# Patient Record
Sex: FEMALE | Race: WHITE | NOT HISPANIC OR LATINO | Employment: FULL TIME | URBAN - METROPOLITAN AREA
[De-identification: names, ages, dates, MRNs, and addresses within clinical notes are randomized per-mention and may not be internally consistent; named-entity substitution may affect disease eponyms.]

---

## 2017-06-06 ENCOUNTER — TELEPHONE (OUTPATIENT)
Dept: MEDICAL GROUP | Facility: MEDICAL CENTER | Age: 42
End: 2017-06-06

## 2017-06-06 DIAGNOSIS — N63.0 BREAST NODULE: ICD-10-CM

## 2017-06-06 DIAGNOSIS — Z12.31 ENCOUNTER FOR SCREENING MAMMOGRAM FOR MALIGNANT NEOPLASM OF BREAST: ICD-10-CM

## 2017-06-07 NOTE — TELEPHONE ENCOUNTER
Please let pt aldoo that its time to recheck her mammo, pancho this time for 6 month f/u.  I have ordered test

## 2017-08-11 ENCOUNTER — HOSPITAL ENCOUNTER (OUTPATIENT)
Dept: RADIOLOGY | Facility: MEDICAL CENTER | Age: 42
End: 2017-08-11
Attending: NURSE PRACTITIONER
Payer: COMMERCIAL

## 2017-08-11 DIAGNOSIS — N63.0 BREAST NODULE: ICD-10-CM

## 2017-08-11 DIAGNOSIS — Z12.31 ENCOUNTER FOR SCREENING MAMMOGRAM FOR MALIGNANT NEOPLASM OF BREAST: ICD-10-CM

## 2017-08-11 PROCEDURE — G0204 DX MAMMO INCL CAD BI: HCPCS

## 2017-08-15 ENCOUNTER — OFFICE VISIT (OUTPATIENT)
Dept: MEDICAL GROUP | Facility: MEDICAL CENTER | Age: 42
End: 2017-08-15
Payer: COMMERCIAL

## 2017-08-15 VITALS
DIASTOLIC BLOOD PRESSURE: 80 MMHG | SYSTOLIC BLOOD PRESSURE: 110 MMHG | HEIGHT: 67 IN | WEIGHT: 202 LBS | BODY MASS INDEX: 31.71 KG/M2 | TEMPERATURE: 98.1 F | HEART RATE: 67 BPM | OXYGEN SATURATION: 96 %

## 2017-08-15 DIAGNOSIS — E66.9 OBESITY (BMI 30-39.9): ICD-10-CM

## 2017-08-15 DIAGNOSIS — Z80.0 FAMILY HISTORY OF COLON CANCER: ICD-10-CM

## 2017-08-15 DIAGNOSIS — E55.9 VITAMIN D DEFICIENCY: ICD-10-CM

## 2017-08-15 DIAGNOSIS — N63.0 BREAST NODULE: ICD-10-CM

## 2017-08-15 DIAGNOSIS — Z13.220 SCREENING FOR HYPERLIPIDEMIA: ICD-10-CM

## 2017-08-15 DIAGNOSIS — R73.01 IFG (IMPAIRED FASTING GLUCOSE): ICD-10-CM

## 2017-08-15 DIAGNOSIS — Z23 NEED FOR TDAP VACCINATION: ICD-10-CM

## 2017-08-15 DIAGNOSIS — K92.1 BLOODY STOOL: ICD-10-CM

## 2017-08-15 DIAGNOSIS — Z83.3 FAMILY HISTORY OF DIABETES MELLITUS IN FATHER: ICD-10-CM

## 2017-08-15 PROCEDURE — 99214 OFFICE O/P EST MOD 30 MIN: CPT | Mod: 25 | Performed by: NURSE PRACTITIONER

## 2017-08-15 PROCEDURE — 90715 TDAP VACCINE 7 YRS/> IM: CPT | Performed by: NURSE PRACTITIONER

## 2017-08-15 PROCEDURE — 90471 IMMUNIZATION ADMIN: CPT | Performed by: NURSE PRACTITIONER

## 2017-08-15 RX ORDER — HYDROCORTISONE ACETATE 25 MG/1
25 SUPPOSITORY RECTAL EVERY 12 HOURS
Qty: 28 SUPPOSITORY | Refills: 0 | Status: ON HOLD | OUTPATIENT
Start: 2017-08-15 | End: 2018-09-12

## 2017-08-15 RX ORDER — MULTIVIT-MIN/IRON/FOLIC ACID/K 18-600-40
2000 CAPSULE ORAL DAILY
Qty: 30 CAP | Refills: 0
Start: 2017-08-15 | End: 2021-07-02

## 2017-08-15 ASSESSMENT — PATIENT HEALTH QUESTIONNAIRE - PHQ9: CLINICAL INTERPRETATION OF PHQ2 SCORE: 0

## 2017-08-15 NOTE — PROGRESS NOTES
"Subjective:      Shawna Walker is a 41 y.o. female who presents with Results            HPI  Seen in f/u for FH of colon cancer.  She has a FH of colon cancer.  Her pat GM had colon cancer.  Pt is also having blood in her stool.  Some days she will have more bright red blood in toilet.  Started several months ago.  No pain with bm''s. No known hemorrhoids.  No abd or back pain.  No black tarry stool.  She doesn't have hemorrhoids.     Reviewed mammo with pt. Her left breast nodule is stable.  Will continue monitoring.   She is due tdap  Reviewed last years lab with pt. H er LP is wnl.  TSH is wnl  CMP showed elevated glucose at 106.  She just a low carb diet.  Her father  with complications of DM.    LP was wnl.  vitmain d was low at 20. She took the ergocalciferol.  Now on otc supplement.      Patient Active Problem List    Diagnosis Date Noted   • Obesity (BMI 30-39.9) 08/15/2017   • Allergy to pollen    • PCOS (polycystic ovarian syndrome)      No current outpatient prescriptions on file.     No current facility-administered medications for this visit.     Allergies   Allergen Reactions   • Sudafed Unspecified     \"reverse effect\" makes mucous thicker   RXN=15 years ago stopped taking         ROS  Review of Systems   Constitutional: Negative.  Negative for fever, chills, weight loss, malaise/fatigue and diaphoresis.   HENT: Negative.  Negative for hearing loss, ear pain, nosebleeds, congestion, sore throat, neck pain, tinnitus and ear discharge.    Respiratory: Negative.  Negative for cough, hemoptysis, sputum production, shortness of breath, wheezing and stridor.    Cardiovascular: Negative.  Negative for chest pain, palpitations, orthopnea, claudication, leg swelling and PND.   Gastrointestinal: denies nausea, vomiting, diarrhea, constipation, heartburn.  Genitourinary: Denies dysuria, hematuria, urinary incontinence, frequency or urgency.         Objective:     /80 mmHg  Pulse 67  Temp(Src) 36.7 °C " "(98.1 °F)  Ht 1.689 m (5' 6.5\")  Wt 91.627 kg (202 lb)  BMI 32.12 kg/m2  SpO2 96%  Breastfeeding? No     Physical Exam      Physical Exam   Vitals reviewed.  Constitutional: oriented to person, place, and time. appears well-developed and well-nourished. No distress.   Cardiovascular: Normal rate, regular rhythm, normal heart sounds and intact distal pulses.  Exam reveals no gallop and no friction rub.  No murmur heard.  No carotid bruits.   Pulmonary/Chest: Effort normal and breath sounds normal. No stridor. No respiratory distress. no wheezes or rales. exhibits no tenderness.   Musculoskeletal: Normal range of motion. exhibits no edema. pancho pedal pulses 2+.  Abd:  No CVAT,  Soft,  Bs noted in all quadrants.  No HSM.  No abdominal tenderness.  Neurological: alert and oriented to person, place, and time. exhibits normal muscle tone. Coordination normal.   Skin: Skin is warm and dry. no diaphoresis.   Psychiatric: normal mood and affect. behavior is normal.            Assessment/Plan:     1. IFG (impaired fasting glucose)  COMP METABOLIC PANEL    HEMOGLOBIN A1C    MICROALBUMIN CREAT RATIO URINE    check CMP, A1C.  f/u with pt with all test results and yrly or sooner if lab abn   2. Family history of diabetes mellitus in father     3. Obesity (BMI 30-39.9)  Patient identified as having weight management issue.  Appropriate orders and counseling given.   4. Family history of colon cancer  REFERRAL TO GASTROENTEROLOGY    refer to GI for colonoscopy.  try anusol if ins will cover for poss internal hemorrhoids   5. Bloody stool  REFERRAL TO GASTROENTEROLOGY    hydrocortisone (ANUSOL-HC) 25 MG Suppos   6. Need for Tdap vaccination  Tdap =>8yo IM   7. Breast nodule     8. Vitamin D deficiency  Cholecalciferol (VITAMIN D) 2000 UNITS Cap    VITAMIN D,25 HYDROXY   9. Screening for hyperlipidemia  LIPID PROFILE       "

## 2017-08-15 NOTE — MR AVS SNAPSHOT
"        Shawna Walker   8/15/2017 8:45 AM   Office Visit   MRN: [id number]    Department:  [facility]   Dept Phone:  [phone]    Description:  Female : 1975   Provider:  CHERIE De La Vega           Reason for Visit     Results           Allergies as of 8/15/2017     Allergen Noted Reactions    Sudafed 12/15/2014   Unspecified    \"reverse effect\" makes mucous thicker   RXN=15 years ago stopped taking      You were diagnosed with     IFG (impaired fasting glucose)   [265196]   check CMP, A1C.  f/u with pt with all test results and yrly or sooner if lab abn    Family history of diabetes mellitus in father   [4375937]       Obesity (BMI 30-39.9)   [806905]       Family history of colon cancer   [368655]   refer to GI for colonoscopy.  try anusol if ins will cover for poss internal hemorrhoids    Bloody stool   [195808]       Need for Tdap vaccination   [214839]       Breast nodule   [937670]       Vitamin D deficiency   [2799619]       Screening for hyperlipidemia   [980266]         Vital Signs     Blood Pressure Pulse Temperature Height Weight Body Mass Index    110/80 mmHg 67 36.7 °C (98.1 °F) 1.689 m (5' 6.5\") 91.627 kg (202 lb) 32.12 kg/m2    Oxygen Saturation Breastfeeding? Smoking Status             96% No Never Smoker          Basic Information     Date Of Birth Sex Race Ethnicity Preferred Language    1975 Female White Non- English      Problem List              ICD-10-CM Priority Class Noted - Resolved    Allergy to pollen J30.1   Unknown - Present    PCOS (polycystic ovarian syndrome) E28.2   Unknown - Present    Obesity (BMI 30-39.9) E66.9   8/15/2017 - Present      Health Maintenance        Date Due Completion Dates    IMM DTaP/Tdap/Td Vaccine (1 - Tdap) 1994 ---    IMM INFLUENZA (1) 2017 ---    MAMMOGRAM 2018, 2016, 2016, 2016    PAP SMEAR 2019 (Done)    Override on 2016: Done            Current Immunizations    " Tdap Vaccine 8/15/2017      Below and/or attached are the medications your provider expects you to take. Review all of your home medications and newly ordered medications with your provider and/or pharmacist. Follow medication instructions as directed by your provider and/or pharmacist. Please keep your medication list with you and share with your provider. Update the information when medications are discontinued, doses are changed, or new medications (including over-the-counter products) are added; and carry medication information at all times in the event of emergency situations     Allergies:  SUDAFED - Unspecified               Medications  Valid as of: August 15, 2017 -  9:23 AM    Generic Name Brand Name Tablet Size Instructions for use    Cholecalciferol (Cap) Vitamin D 2000 UNITS Take 1 Cap by mouth every day.        Hydrocortisone Acetate (Suppos) ANUSOL-HC 25 MG Insert 1 Suppository in rectum every 12 hours.        .                 Medicines prescribed today were sent to:     [facility] [address] [place] [address], [place] - [address]    [address], [place] [address]    Phone: [phone] Fax: [phone]    Open 24 Hours?: No      Medication refill instructions:       If your prescription bottle indicates you have medication refills left, it is not necessary to call your provider’s office. Please contact your pharmacy and they will refill your medication.    If your prescription bottle indicates you do not have any refills left, you may request refills at any time through one of the following ways: The online [employer] system (except Urgent Care), by calling your provider’s office, or by asking your pharmacy to contact your provider’s office with a refill request. Medication refills are processed only during regular business hours and may not be available until the next business day. Your provider may request additional information or to have a follow-up visit with you prior to refilling your  medication.   *Please Note: Medication refills are assigned a new Rx number when refilled electronically. Your pharmacy may indicate that no refills were authorized even though a new prescription for the same medication is available at the pharmacy. Please request the medicine by name with the pharmacy before contacting your provider for a refill.        Your To Do List     Future Labs/Procedures Complete By Expires    COMP METABOLIC PANEL  As directed 8/16/2018    HEMOGLOBIN A1C  As directed 8/16/2018    LIPID PROFILE  As directed 8/16/2018    MICROALBUMIN CREAT RATIO URINE  As directed 8/16/2018    VITAMIN D,25 HYDROXY  As directed 8/16/2018      Referral     A referral request has been sent to our patient care coordination department. Please allow 3-5 business days for us to process this request and contact you either by phone or mail. If you do not hear from us by the 5th business day, please call us at [phone].           Daily[employer] Access Code: Activation code not generated  Current Daily[employer] Status: Active

## 2018-02-04 ENCOUNTER — TELEPHONE (OUTPATIENT)
Dept: MEDICAL GROUP | Facility: MEDICAL CENTER | Age: 43
End: 2018-02-04

## 2018-02-04 DIAGNOSIS — N63.0 BREAST NODULE: ICD-10-CM

## 2018-04-15 ENCOUNTER — TELEPHONE (OUTPATIENT)
Dept: MEDICAL GROUP | Facility: MEDICAL CENTER | Age: 43
End: 2018-04-15

## 2018-04-26 ENCOUNTER — HOSPITAL ENCOUNTER (EMERGENCY)
Facility: MEDICAL CENTER | Age: 43
End: 2018-04-26

## 2018-04-26 VITALS
TEMPERATURE: 97.2 F | WEIGHT: 201.06 LBS | RESPIRATION RATE: 18 BRPM | DIASTOLIC BLOOD PRESSURE: 100 MMHG | BODY MASS INDEX: 32.31 KG/M2 | HEART RATE: 97 BPM | HEIGHT: 66 IN | SYSTOLIC BLOOD PRESSURE: 133 MMHG

## 2018-04-26 DIAGNOSIS — F10.920 ALCOHOLIC INTOXICATION WITHOUT COMPLICATION (HCC): ICD-10-CM

## 2018-04-26 PROCEDURE — 99283 EMERGENCY DEPT VISIT LOW MDM: CPT

## 2018-04-26 PROCEDURE — 99284 EMERGENCY DEPT VISIT MOD MDM: CPT

## 2018-04-26 ASSESSMENT — PAIN SCALES - GENERAL: PAINLEVEL_OUTOF10: 0

## 2018-04-26 NOTE — ED PROVIDER NOTES
ED Provider Note    I have called and left a message with patient stating that she may return to the emergency department at any time, that she should return tonight and that if her symptoms fail to resolve despite clinical sobriety that she should return.

## 2018-04-26 NOTE — ED NOTES
"Patient brought in by mother for blurred vision and intoxication. Patient states \"my eyes are blurry but it might just be my contacts.\" Mother states patient drank 3 bottle of wine tonight.   "

## 2018-04-26 NOTE — ED PROVIDER NOTES
"ED Provider Note    CHIEF COMPLAINT  Chief Complaint   Patient presents with   • Alcohol Intoxication       HPI  Shawna Walker is a 42 y.o. female who presents with chief complaint of alcohol intoxication. Per patient she drank 3 bottles of wine tonight. Wine was over-the-counter, she denies any unconventional alcohol use. Patient reports mild blurriness of her vision. She denies any associated weakness or numbness. She denies any fevers or constitutional symptoms. She denies any headache.    REVIEW OF SYSTEMS  Review of Systems   All other systems reviewed and are negative.    See HPI for further details. All other systems are negative.     PAST MEDICAL HISTORY   has a past medical history of PCOS (polycystic ovarian syndrome).    SOCIAL HISTORY  Social History     Social History Main Topics   • Smoking status: Never Smoker   • Smokeless tobacco: Never Used   • Alcohol use 8.4 oz/week     14 Glasses of wine per week      Comment: 4 drinks per week    • Drug use: No   • Sexual activity: Not on file       SURGICAL HISTORY   has a past surgical history that includes tubal ligation; tubal reanastomosis (06/2015); hysterectomy robotic (8/15/2016); and cystoscopy (8/15/2016).    CURRENT MEDICATIONS  Home Medications    **Home medications have not yet been reviewed for this encounter**         ALLERGIES  Allergies   Allergen Reactions   • Sudafed Unspecified     \"reverse effect\" makes mucous thicker   RXN=15 years ago stopped taking       PHYSICAL EXAM  Physical Exam   Constitutional: She is oriented to person, place, and time. She appears well-developed and well-nourished.   Eyes: Conjunctivae are normal. Pupils are equal, round, and reactive to light. Right eye exhibits no discharge. Left eye exhibits no discharge.   Neck: Normal range of motion.   Cardiovascular: Normal rate and regular rhythm.    Pulmonary/Chest: Effort normal and breath sounds normal. No respiratory distress. She has no wheezes.   Abdominal: Soft. " "Bowel sounds are normal. She exhibits no distension. There is no tenderness. There is no rebound and no guarding.   Neurological: She is alert and oriented to person, place, and time.   Distal and proximal strength 5/5 in upper and lower extremities. Normal gait. No dysmetria. No sensation deficits. No visual field deficits. Cranial nerves intact.      Skin: Skin is warm.   Psychiatric:   Emotionally labile. Repetitive drawn out blinking         COURSE & MEDICAL DECISION MAKING  Pertinent Labs & Imaging studies reviewed. (See chart for details)  I evaluated patient. She has symptoms consistent with mild alcohol intoxication. I am concerned about her blurry vision and I discussed with the patient and told her this was very atypical of alcohol intoxication, I stated that she is acting a little \"wonky\" and that I was concerned about a possible coingestant or metabolic cause. Patient became very angry with this statement. I apologized for my poor choice of words and stated that I was merely trying to convey that her presentation is atypical. I relayed this to mother who is at bedside. I stated that given the exam inconsistencies that possibly checking a CT scan and basic labs would be pertinent versus observing the patient to ensure that symptoms all resolved upon patient's sobering up. Patient is verbally combative and I left the room for her mother and her to discuss these two treatment options. Her mother is not displaying any signs of alcohol intoxication or intoxication otherwise. Shortly after walking out of the room patient eloped with her mother who is clinically sober.       FINAL IMPRESSION  1. Alcohol intoxication         Electronically signed by: Gagan Randall, 4/26/2018 1:19 AM      "

## 2018-05-16 ENCOUNTER — TELEPHONE (OUTPATIENT)
Dept: MEDICAL GROUP | Facility: MEDICAL CENTER | Age: 43
End: 2018-05-16

## 2018-05-16 NOTE — LETTER
May 23, 2018        Shawna Walker  [address]  [place] [address]        Dear Shawna:    Priscilla Orozco's office has tried contacting you. At your earliest convenience please contact our office at [phone].      If you have any questions or concerns, please don't hesitate to call.        Sincerely,        Priscilla Orozco, QUANGPAMOR.    Electronically Signed

## 2018-05-17 NOTE — TELEPHONE ENCOUNTER
Please check with pt and see when or if she is going to do the f/u mammo and us that was ordered in feb.

## 2018-07-10 ENCOUNTER — TELEPHONE (OUTPATIENT)
Dept: MEDICAL GROUP | Facility: MEDICAL CENTER | Age: 43
End: 2018-07-10

## 2018-07-11 NOTE — TELEPHONE ENCOUNTER
Pt informed- pt states we stopped taking her insurance.pt states her insurance starts up in Sept and she will do it then.

## 2018-09-11 ENCOUNTER — APPOINTMENT (OUTPATIENT)
Dept: RADIOLOGY | Facility: MEDICAL CENTER | Age: 43
DRG: 917 | End: 2018-09-11
Attending: EMERGENCY MEDICINE
Payer: COMMERCIAL

## 2018-09-11 ENCOUNTER — HOSPITAL ENCOUNTER (INPATIENT)
Facility: MEDICAL CENTER | Age: 43
LOS: 2 days | DRG: 917 | End: 2018-09-13
Attending: EMERGENCY MEDICINE | Admitting: HOSPITALIST
Payer: COMMERCIAL

## 2018-09-11 DIAGNOSIS — G92.9 TOXIC ENCEPHALOPATHY: ICD-10-CM

## 2018-09-11 DIAGNOSIS — T42.4X2A INTENTIONAL BENZODIAZEPINE OVERDOSE, INITIAL ENCOUNTER (HCC): ICD-10-CM

## 2018-09-11 PROBLEM — T50.901A OVERDOSE: Status: ACTIVE | Noted: 2018-09-11

## 2018-09-11 PROBLEM — R09.02 HYPOXEMIA: Status: ACTIVE | Noted: 2018-09-11

## 2018-09-11 PROBLEM — F10.921 ACUTE ALCOHOLIC INTOXICATION WITH DELIRIUM (HCC): Status: ACTIVE | Noted: 2018-09-11

## 2018-09-11 LAB
ALBUMIN SERPL BCP-MCNC: 4 G/DL (ref 3.2–4.9)
ALBUMIN/GLOB SERPL: 1.4 G/DL
ALP SERPL-CCNC: 48 U/L (ref 30–99)
ALT SERPL-CCNC: 20 U/L (ref 2–50)
AMPHETAMINES UR QL: NEGATIVE
ANION GAP SERPL CALC-SCNC: 11 MMOL/L (ref 0–11.9)
ANION GAP SERPL CALC-SCNC: 8 MMOL/L (ref 0–11.9)
APAP SERPL-MCNC: <10 UG/ML (ref 10–30)
AST SERPL-CCNC: 20 U/L (ref 12–45)
BARBITURATES UR QL SCN: NEGATIVE
BASE EXCESS BLDV CALC-SCNC: -6 MMOL/L
BASOPHILS # BLD AUTO: 0.5 % (ref 0–1.8)
BASOPHILS # BLD: 0.04 K/UL (ref 0–0.12)
BENZODIAZ UR QL SCN: NEGATIVE
BILIRUB SERPL-MCNC: 0.5 MG/DL (ref 0.1–1.5)
BODY TEMPERATURE: ABNORMAL CENTIGRADE
BUN SERPL-MCNC: 10 MG/DL (ref 8–22)
BUN SERPL-MCNC: 13 MG/DL (ref 8–22)
BZE UR QL SCN: NEGATIVE
CALCIUM SERPL-MCNC: 8.3 MG/DL (ref 8.4–10.2)
CALCIUM SERPL-MCNC: 8.4 MG/DL (ref 8.4–10.2)
CHLORIDE SERPL-SCNC: 110 MMOL/L (ref 96–112)
CHLORIDE SERPL-SCNC: 111 MMOL/L (ref 96–112)
CO2 SERPL-SCNC: 19 MMOL/L (ref 20–33)
CO2 SERPL-SCNC: 22 MMOL/L (ref 20–33)
CREAT SERPL-MCNC: 0.73 MG/DL (ref 0.5–1.4)
CREAT SERPL-MCNC: 0.78 MG/DL (ref 0.5–1.4)
EOSINOPHIL # BLD AUTO: 0.23 K/UL (ref 0–0.51)
EOSINOPHIL NFR BLD: 2.9 % (ref 0–6.9)
ERYTHROCYTE [DISTWIDTH] IN BLOOD BY AUTOMATED COUNT: 43.3 FL (ref 35.9–50)
ETHANOL BLD-MCNC: 0.28 G/DL
GLOBULIN SER CALC-MCNC: 2.8 G/DL (ref 1.9–3.5)
GLUCOSE SERPL-MCNC: 104 MG/DL (ref 65–99)
GLUCOSE SERPL-MCNC: 106 MG/DL (ref 65–99)
HCG SERPL QL: NEGATIVE
HCO3 BLDV-SCNC: 18 MMOL/L (ref 24–28)
HCT VFR BLD AUTO: 41.3 % (ref 37–47)
HGB BLD-MCNC: 14.1 G/DL (ref 12–16)
IMM GRANULOCYTES # BLD AUTO: 0.01 K/UL (ref 0–0.11)
IMM GRANULOCYTES NFR BLD AUTO: 0.1 % (ref 0–0.9)
LYMPHOCYTES # BLD AUTO: 3.52 K/UL (ref 1–4.8)
LYMPHOCYTES NFR BLD: 43.7 % (ref 22–41)
MAGNESIUM SERPL-MCNC: 2.2 MG/DL (ref 1.5–2.5)
MCH RBC QN AUTO: 32 PG (ref 27–33)
MCHC RBC AUTO-ENTMCNC: 34.1 G/DL (ref 33.6–35)
MCV RBC AUTO: 93.9 FL (ref 81.4–97.8)
MONOCYTES # BLD AUTO: 0.63 K/UL (ref 0–0.85)
MONOCYTES NFR BLD AUTO: 7.8 % (ref 0–13.4)
NEUTROPHILS # BLD AUTO: 3.62 K/UL (ref 2–7.15)
NEUTROPHILS NFR BLD: 45 % (ref 44–72)
NRBC # BLD AUTO: 0 K/UL
NRBC BLD-RTO: 0 /100 WBC
PCO2 BLDV: 30.9 MMHG (ref 41–51)
PCP UR QL SCN: NEGATIVE
PH BLDV: 7.39 [PH] (ref 7.31–7.45)
PLATELET # BLD AUTO: 237 K/UL (ref 164–446)
PMV BLD AUTO: 9.2 FL (ref 9–12.9)
PO2 BLDV: 101.9 MMHG (ref 25–40)
POTASSIUM SERPL-SCNC: 3.6 MMOL/L (ref 3.6–5.5)
POTASSIUM SERPL-SCNC: 3.7 MMOL/L (ref 3.6–5.5)
PROT SERPL-MCNC: 6.8 G/DL (ref 6–8.2)
RBC # BLD AUTO: 4.4 M/UL (ref 4.2–5.4)
SALICYLATES SERPL-MCNC: <4 MG/DL (ref 15–25)
SAO2 % BLDV: 97.4 %
SODIUM SERPL-SCNC: 140 MMOL/L (ref 135–145)
SODIUM SERPL-SCNC: 141 MMOL/L (ref 135–145)
TSH SERPL DL<=0.005 MIU/L-ACNC: 1.07 UIU/ML (ref 0.38–5.33)
UR OPIATES 2659: NEGATIVE
UR THC 2511T: NEGATIVE
WBC # BLD AUTO: 8.1 K/UL (ref 4.8–10.8)

## 2018-09-11 PROCEDURE — 700105 HCHG RX REV CODE 258: Performed by: EMERGENCY MEDICINE

## 2018-09-11 PROCEDURE — 99222 1ST HOSP IP/OBS MODERATE 55: CPT | Performed by: HOSPITALIST

## 2018-09-11 PROCEDURE — 71045 X-RAY EXAM CHEST 1 VIEW: CPT

## 2018-09-11 PROCEDURE — 700105 HCHG RX REV CODE 258: Performed by: HOSPITALIST

## 2018-09-11 PROCEDURE — 85025 COMPLETE CBC W/AUTO DIFF WBC: CPT

## 2018-09-11 PROCEDURE — 96365 THER/PROPH/DIAG IV INF INIT: CPT

## 2018-09-11 PROCEDURE — 93005 ELECTROCARDIOGRAM TRACING: CPT | Performed by: EMERGENCY MEDICINE

## 2018-09-11 PROCEDURE — 80053 COMPREHEN METABOLIC PANEL: CPT

## 2018-09-11 PROCEDURE — 80048 BASIC METABOLIC PNL TOTAL CA: CPT

## 2018-09-11 PROCEDURE — 770022 HCHG ROOM/CARE - ICU (200)

## 2018-09-11 PROCEDURE — 700101 HCHG RX REV CODE 250

## 2018-09-11 PROCEDURE — 80305 DRUG TEST PRSMV DIR OPT OBS: CPT

## 2018-09-11 PROCEDURE — 99291 CRITICAL CARE FIRST HOUR: CPT

## 2018-09-11 PROCEDURE — 84703 CHORIONIC GONADOTROPIN ASSAY: CPT

## 2018-09-11 PROCEDURE — 700105 HCHG RX REV CODE 258: Performed by: INTERNAL MEDICINE

## 2018-09-11 PROCEDURE — 82803 BLOOD GASES ANY COMBINATION: CPT

## 2018-09-11 PROCEDURE — 80307 DRUG TEST PRSMV CHEM ANLYZR: CPT

## 2018-09-11 PROCEDURE — 83735 ASSAY OF MAGNESIUM: CPT

## 2018-09-11 PROCEDURE — 84443 ASSAY THYROID STIM HORMONE: CPT

## 2018-09-11 RX ORDER — THIAMINE MONONITRATE (VIT B1) 100 MG
100 TABLET ORAL DAILY
Status: DISCONTINUED | OUTPATIENT
Start: 2018-09-12 | End: 2018-09-13 | Stop reason: HOSPADM

## 2018-09-11 RX ORDER — SODIUM CHLORIDE 9 MG/ML
1000 INJECTION, SOLUTION INTRAVENOUS ONCE
Status: COMPLETED | OUTPATIENT
Start: 2018-09-11 | End: 2018-09-11

## 2018-09-11 RX ORDER — SODIUM CHLORIDE 9 MG/ML
500 INJECTION, SOLUTION INTRAVENOUS ONCE
Status: COMPLETED | OUTPATIENT
Start: 2018-09-12 | End: 2018-09-12

## 2018-09-11 RX ORDER — PROMETHAZINE HYDROCHLORIDE 25 MG/1
12.5-25 SUPPOSITORY RECTAL EVERY 4 HOURS PRN
Status: DISCONTINUED | OUTPATIENT
Start: 2018-09-11 | End: 2018-09-13 | Stop reason: HOSPADM

## 2018-09-11 RX ORDER — FOLIC ACID 1 MG/1
1 TABLET ORAL DAILY
Status: DISCONTINUED | OUTPATIENT
Start: 2018-09-12 | End: 2018-09-13 | Stop reason: HOSPADM

## 2018-09-11 RX ORDER — SODIUM CHLORIDE 9 MG/ML
INJECTION, SOLUTION INTRAVENOUS CONTINUOUS
Status: DISCONTINUED | OUTPATIENT
Start: 2018-09-11 | End: 2018-09-13 | Stop reason: HOSPADM

## 2018-09-11 RX ORDER — ONDANSETRON 2 MG/ML
4 INJECTION INTRAMUSCULAR; INTRAVENOUS EVERY 4 HOURS PRN
Status: DISCONTINUED | OUTPATIENT
Start: 2018-09-11 | End: 2018-09-13 | Stop reason: HOSPADM

## 2018-09-11 RX ORDER — PROMETHAZINE HYDROCHLORIDE 25 MG/1
12.5-25 TABLET ORAL EVERY 4 HOURS PRN
Status: DISCONTINUED | OUTPATIENT
Start: 2018-09-11 | End: 2018-09-13 | Stop reason: HOSPADM

## 2018-09-11 RX ORDER — M-VIT,TX,IRON,MINS/CALC/FOLIC 27MG-0.4MG
1 TABLET ORAL DAILY
COMMUNITY
End: 2019-06-19

## 2018-09-11 RX ORDER — ONDANSETRON 4 MG/1
4 TABLET, ORALLY DISINTEGRATING ORAL EVERY 4 HOURS PRN
Status: DISCONTINUED | OUTPATIENT
Start: 2018-09-11 | End: 2018-09-13 | Stop reason: HOSPADM

## 2018-09-11 RX ORDER — FLUOXETINE HYDROCHLORIDE 40 MG/1
80 CAPSULE ORAL DAILY
COMMUNITY
End: 2021-07-02

## 2018-09-11 RX ADMIN — THIAMINE HYDROCHLORIDE 1000 ML: 100 INJECTION, SOLUTION INTRAMUSCULAR; INTRAVENOUS at 21:05

## 2018-09-11 RX ADMIN — SODIUM CHLORIDE 500 ML: 9 INJECTION, SOLUTION INTRAVENOUS at 23:53

## 2018-09-11 RX ADMIN — SODIUM CHLORIDE 1000 ML: 9 INJECTION, SOLUTION INTRAVENOUS at 18:00

## 2018-09-11 RX ADMIN — SODIUM CHLORIDE: 9 INJECTION, SOLUTION INTRAVENOUS at 22:33

## 2018-09-11 ASSESSMENT — COPD QUESTIONNAIRES
COPD SCREENING SCORE: 0
HAVE YOU SMOKED AT LEAST 100 CIGARETTES IN YOUR ENTIRE LIFE: NO/DON'T KNOW
DO YOU EVER COUGH UP ANY MUCUS OR PHLEGM?: NO/ONLY WITH OCCASIONAL COLDS OR INFECTIONS
IN THE PAST 12 MONTHS DO YOU DO LESS THAN YOU USED TO BECAUSE OF YOUR BREATHING PROBLEMS: DISAGREE/UNSURE
DURING THE PAST 4 WEEKS HOW MUCH DID YOU FEEL SHORT OF BREATH: NONE/LITTLE OF THE TIME

## 2018-09-11 ASSESSMENT — LIFESTYLE VARIABLES
VISUAL DISTURBANCES: NOT PRESENT
NAUSEA AND VOMITING: NO NAUSEA AND NO VOMITING
AUDITORY DISTURBANCES: NOT PRESENT
ANXIETY: *
TREMOR: TREMOR NOT VISIBLE BUT CAN BE FELT, FINGERTIP TO FINGERTIP
AUDITORY DISTURBANCES: NOT PRESENT
EVER HAD A DRINK FIRST THING IN THE MORNING TO STEADY YOUR NERVES TO GET RID OF A HANGOVER: NO
PAROXYSMAL SWEATS: NO SWEAT VISIBLE
HEADACHE, FULLNESS IN HEAD: NOT PRESENT
HAVE PEOPLE ANNOYED YOU BY CRITICIZING YOUR DRINKING: YES
TREMOR: TREMOR NOT VISIBLE BUT CAN BE FELT, FINGERTIP TO FINGERTIP
ON A TYPICAL DAY WHEN YOU DRINK ALCOHOL HOW MANY DRINKS DO YOU HAVE: 5
EVER FELT BAD OR GUILTY ABOUT YOUR DRINKING: YES
AGITATION: NORMAL ACTIVITY
HEADACHE, FULLNESS IN HEAD: NOT PRESENT
TOTAL SCORE: 6
ANXIETY: *
HAVE YOU EVER FELT YOU SHOULD CUT DOWN ON YOUR DRINKING: YES
EVER_SMOKED: YES
DOES PATIENT WANT TO STOP DRINKING: NO
HOW MANY TIMES IN THE PAST YEAR HAVE YOU HAD 5 OR MORE DRINKS IN A DAY: 40
EVER_SMOKED: YES
TOTAL SCORE: 3
TOTAL SCORE: 3
AVERAGE NUMBER OF DAYS PER WEEK YOU HAVE A DRINK CONTAINING ALCOHOL: 7
ALCOHOL_USE: YES
VISUAL DISTURBANCES: NOT PRESENT
PAROXYSMAL SWEATS: NO SWEAT VISIBLE
TOTAL SCORE: 3
ORIENTATION AND CLOUDING OF SENSORIUM: CANNOT DO SERIAL ADDITIONS OR IS UNCERTAIN ABOUT DATE
TOTAL SCORE: 3
NAUSEA AND VOMITING: NO NAUSEA AND NO VOMITING
CONSUMPTION TOTAL: POSITIVE
ORIENTATION AND CLOUDING OF SENSORIUM: ORIENTED AND CAN DO SERIAL ADDITIONS
AGITATION: SOMEWHAT MORE THAN NORMAL ACTIVITY

## 2018-09-11 ASSESSMENT — PATIENT HEALTH QUESTIONNAIRE - PHQ9
5. POOR APPETITE OR OVEREATING: NOT AT ALL
3. TROUBLE FALLING OR STAYING ASLEEP OR SLEEPING TOO MUCH: NEARLY EVERY DAY
6. FEELING BAD ABOUT YOURSELF - OR THAT YOU ARE A FAILURE OR HAVE LET YOURSELF OR YOUR FAMILY DOWN: NEARLY EVERY DAY
SUM OF ALL RESPONSES TO PHQ9 QUESTIONS 1 AND 2: 4
1. LITTLE INTEREST OR PLEASURE IN DOING THINGS: SEVERAL DAYS
8. MOVING OR SPEAKING SO SLOWLY THAT OTHER PEOPLE COULD HAVE NOTICED. OR THE OPPOSITE, BEING SO FIGETY OR RESTLESS THAT YOU HAVE BEEN MOVING AROUND A LOT MORE THAN USUAL: SEVERAL DAYS
9. THOUGHTS THAT YOU WOULD BE BETTER OFF DEAD, OR OF HURTING YOURSELF: NEARLY EVERY DAY
2. FEELING DOWN, DEPRESSED, IRRITABLE, OR HOPELESS: NEARLY EVERY DAY
7. TROUBLE CONCENTRATING ON THINGS, SUCH AS READING THE NEWSPAPER OR WATCHING TELEVISION: SEVERAL DAYS
SUM OF ALL RESPONSES TO PHQ QUESTIONS 1-9: 18
4. FEELING TIRED OR HAVING LITTLE ENERGY: NEARLY EVERY DAY

## 2018-09-11 ASSESSMENT — COGNITIVE AND FUNCTIONAL STATUS - GENERAL
CLIMB 3 TO 5 STEPS WITH RAILING: A LITTLE
SUGGESTED CMS G CODE MODIFIER DAILY ACTIVITY: CJ
MOBILITY SCORE: 21
TOILETING: A LITTLE
STANDING UP FROM CHAIR USING ARMS: A LITTLE
DRESSING REGULAR UPPER BODY CLOTHING: A LITTLE
WALKING IN HOSPITAL ROOM: A LITTLE
SUGGESTED CMS G CODE MODIFIER MOBILITY: CJ
HELP NEEDED FOR BATHING: A LITTLE
DAILY ACTIVITIY SCORE: 20
PERSONAL GROOMING: A LITTLE

## 2018-09-12 LAB
ALBUMIN SERPL BCP-MCNC: 3.5 G/DL (ref 3.2–4.9)
ALBUMIN/GLOB SERPL: 1.3 G/DL
ALP SERPL-CCNC: 42 U/L (ref 30–99)
ALT SERPL-CCNC: 15 U/L (ref 2–50)
ANION GAP SERPL CALC-SCNC: 7 MMOL/L (ref 0–11.9)
AST SERPL-CCNC: 17 U/L (ref 12–45)
BASOPHILS # BLD AUTO: 0.4 % (ref 0–1.8)
BASOPHILS # BLD: 0.03 K/UL (ref 0–0.12)
BILIRUB SERPL-MCNC: 0.3 MG/DL (ref 0.1–1.5)
BUN SERPL-MCNC: 12 MG/DL (ref 8–22)
CALCIUM SERPL-MCNC: 7.4 MG/DL (ref 8.4–10.2)
CHLORIDE SERPL-SCNC: 111 MMOL/L (ref 96–112)
CO2 SERPL-SCNC: 21 MMOL/L (ref 20–33)
CREAT SERPL-MCNC: 0.67 MG/DL (ref 0.5–1.4)
EOSINOPHIL # BLD AUTO: 0.14 K/UL (ref 0–0.51)
EOSINOPHIL NFR BLD: 2 % (ref 0–6.9)
ERYTHROCYTE [DISTWIDTH] IN BLOOD BY AUTOMATED COUNT: 44.2 FL (ref 35.9–50)
GLOBULIN SER CALC-MCNC: 2.6 G/DL (ref 1.9–3.5)
GLUCOSE SERPL-MCNC: 80 MG/DL (ref 65–99)
HCT VFR BLD AUTO: 37.1 % (ref 37–47)
HGB BLD-MCNC: 12.4 G/DL (ref 12–16)
IMM GRANULOCYTES # BLD AUTO: 0.02 K/UL (ref 0–0.11)
IMM GRANULOCYTES NFR BLD AUTO: 0.3 % (ref 0–0.9)
LYMPHOCYTES # BLD AUTO: 1.9 K/UL (ref 1–4.8)
LYMPHOCYTES NFR BLD: 26.5 % (ref 22–41)
MAGNESIUM SERPL-MCNC: 1.9 MG/DL (ref 1.5–2.5)
MCH RBC QN AUTO: 32 PG (ref 27–33)
MCHC RBC AUTO-ENTMCNC: 33.4 G/DL (ref 33.6–35)
MCV RBC AUTO: 95.6 FL (ref 81.4–97.8)
MONOCYTES # BLD AUTO: 0.56 K/UL (ref 0–0.85)
MONOCYTES NFR BLD AUTO: 7.8 % (ref 0–13.4)
NEUTROPHILS # BLD AUTO: 4.51 K/UL (ref 2–7.15)
NEUTROPHILS NFR BLD: 63 % (ref 44–72)
NRBC # BLD AUTO: 0 K/UL
NRBC BLD-RTO: 0 /100 WBC
PHOSPHATE SERPL-MCNC: 2.8 MG/DL (ref 2.5–4.5)
PLATELET # BLD AUTO: 237 K/UL (ref 164–446)
PMV BLD AUTO: 9.6 FL (ref 9–12.9)
POTASSIUM SERPL-SCNC: 3.6 MMOL/L (ref 3.6–5.5)
PROT SERPL-MCNC: 6.1 G/DL (ref 6–8.2)
RBC # BLD AUTO: 3.88 M/UL (ref 4.2–5.4)
SODIUM SERPL-SCNC: 139 MMOL/L (ref 135–145)
WBC # BLD AUTO: 7.2 K/UL (ref 4.8–10.8)

## 2018-09-12 PROCEDURE — 85025 COMPLETE CBC W/AUTO DIFF WBC: CPT

## 2018-09-12 PROCEDURE — 94760 N-INVAS EAR/PLS OXIMETRY 1: CPT

## 2018-09-12 PROCEDURE — 700105 HCHG RX REV CODE 258: Performed by: HOSPITALIST

## 2018-09-12 PROCEDURE — A9270 NON-COVERED ITEM OR SERVICE: HCPCS | Performed by: HOSPITALIST

## 2018-09-12 PROCEDURE — 700111 HCHG RX REV CODE 636 W/ 250 OVERRIDE (IP): Performed by: HOSPITALIST

## 2018-09-12 PROCEDURE — 84100 ASSAY OF PHOSPHORUS: CPT

## 2018-09-12 PROCEDURE — 99252 IP/OBS CONSLTJ NEW/EST SF 35: CPT | Performed by: INTERNAL MEDICINE

## 2018-09-12 PROCEDURE — 80053 COMPREHEN METABOLIC PANEL: CPT

## 2018-09-12 PROCEDURE — 99233 SBSQ HOSP IP/OBS HIGH 50: CPT | Performed by: INTERNAL MEDICINE

## 2018-09-12 PROCEDURE — 700102 HCHG RX REV CODE 250 W/ 637 OVERRIDE(OP): Performed by: HOSPITALIST

## 2018-09-12 PROCEDURE — 83735 ASSAY OF MAGNESIUM: CPT

## 2018-09-12 PROCEDURE — 770006 HCHG ROOM/CARE - MED/SURG/GYN SEMI*

## 2018-09-12 PROCEDURE — 700105 HCHG RX REV CODE 258: Performed by: INTERNAL MEDICINE

## 2018-09-12 PROCEDURE — 700101 HCHG RX REV CODE 250: Performed by: HOSPITALIST

## 2018-09-12 RX ORDER — LORAZEPAM 1 MG/1
0.5 TABLET ORAL DAILY
Status: ON HOLD | COMMUNITY
End: 2018-09-12

## 2018-09-12 RX ADMIN — SODIUM CHLORIDE: 9 INJECTION, SOLUTION INTRAVENOUS at 05:54

## 2018-09-12 RX ADMIN — FOLIC ACID: 5 INJECTION, SOLUTION INTRAMUSCULAR; INTRAVENOUS; SUBCUTANEOUS at 21:17

## 2018-09-12 RX ADMIN — SODIUM CHLORIDE: 9 INJECTION, SOLUTION INTRAVENOUS at 20:08

## 2018-09-12 RX ADMIN — SODIUM CHLORIDE: 9 INJECTION, SOLUTION INTRAVENOUS at 13:01

## 2018-09-12 RX ADMIN — Medication 100 MG: at 05:24

## 2018-09-12 RX ADMIN — FOLIC ACID 1 MG: 1 TABLET ORAL at 05:24

## 2018-09-12 RX ADMIN — ENOXAPARIN SODIUM 40 MG: 100 INJECTION SUBCUTANEOUS at 05:24

## 2018-09-12 RX ADMIN — THERA TABS 1 TABLET: TAB at 05:24

## 2018-09-12 ASSESSMENT — ENCOUNTER SYMPTOMS
HEADACHES: 0
DIARRHEA: 0
DIAPHORESIS: 0
NAUSEA: 0
FEVER: 0
HEARTBURN: 0
DIZZINESS: 0
WEIGHT LOSS: 0
VOMITING: 0
COUGH: 0
SHORTNESS OF BREATH: 0
CHILLS: 0
EYE REDNESS: 1
EYE DISCHARGE: 0
CONSTIPATION: 0
SORE THROAT: 0
FALLS: 0
WEAKNESS: 0
DOUBLE VISION: 0
STRIDOR: 0
MYALGIAS: 0
SPEECH CHANGE: 0
ABDOMINAL PAIN: 0
DEPRESSION: 0
SPUTUM PRODUCTION: 0
WHEEZING: 0
BLURRED VISION: 0
SINUS PAIN: 0
HEMOPTYSIS: 0
NECK PAIN: 0
NERVOUS/ANXIOUS: 0
PALPITATIONS: 0

## 2018-09-12 ASSESSMENT — LIFESTYLE VARIABLES
ORIENTATION AND CLOUDING OF SENSORIUM: ORIENTED AND CAN DO SERIAL ADDITIONS
PAROXYSMAL SWEATS: NO SWEAT VISIBLE
PAROXYSMAL SWEATS: NO SWEAT VISIBLE
AGITATION: NORMAL ACTIVITY
VISUAL DISTURBANCES: NOT PRESENT
TREMOR: NO TREMOR
ORIENTATION AND CLOUDING OF SENSORIUM: ORIENTED AND CAN DO SERIAL ADDITIONS
AUDITORY DISTURBANCES: NOT PRESENT
SUBSTANCE_ABUSE: 1
TOTAL SCORE: 1
TOTAL SCORE: 2
AUDITORY DISTURBANCES: NOT PRESENT
VISUAL DISTURBANCES: NOT PRESENT
AGITATION: NORMAL ACTIVITY
ANXIETY: NO ANXIETY (AT EASE)
HEADACHE, FULLNESS IN HEAD: NOT PRESENT
HEADACHE, FULLNESS IN HEAD: NOT PRESENT
ORIENTATION AND CLOUDING OF SENSORIUM: ORIENTED AND CAN DO SERIAL ADDITIONS
AUDITORY DISTURBANCES: NOT PRESENT
ANXIETY: MILDLY ANXIOUS
TOTAL SCORE: 0
AUDITORY DISTURBANCES: NOT PRESENT
ORIENTATION AND CLOUDING OF SENSORIUM: ORIENTED AND CAN DO SERIAL ADDITIONS
ORIENTATION AND CLOUDING OF SENSORIUM: ORIENTED AND CAN DO SERIAL ADDITIONS
AGITATION: NORMAL ACTIVITY
NAUSEA AND VOMITING: NO NAUSEA AND NO VOMITING
TREMOR: NO TREMOR
NAUSEA AND VOMITING: MILD NAUSEA WITH NO VOMITING
TREMOR: NO TREMOR
VISUAL DISTURBANCES: NOT PRESENT
NAUSEA AND VOMITING: NO NAUSEA AND NO VOMITING
HEADACHE, FULLNESS IN HEAD: NOT PRESENT
ORIENTATION AND CLOUDING OF SENSORIUM: ORIENTED AND CAN DO SERIAL ADDITIONS
TREMOR: TREMOR NOT VISIBLE BUT CAN BE FELT, FINGERTIP TO FINGERTIP
PAROXYSMAL SWEATS: NO SWEAT VISIBLE
VISUAL DISTURBANCES: NOT PRESENT
ANXIETY: MILDLY ANXIOUS
PAROXYSMAL SWEATS: NO SWEAT VISIBLE
TOTAL SCORE: 3
AGITATION: NORMAL ACTIVITY
NAUSEA AND VOMITING: NO NAUSEA AND NO VOMITING
EVER_SMOKED: YES
TOTAL SCORE: 0
PAROXYSMAL SWEATS: NO SWEAT VISIBLE
AGITATION: SOMEWHAT MORE THAN NORMAL ACTIVITY
VISUAL DISTURBANCES: NOT PRESENT
AUDITORY DISTURBANCES: NOT PRESENT
AGITATION: NORMAL ACTIVITY
HEADACHE, FULLNESS IN HEAD: NOT PRESENT
VISUAL DISTURBANCES: NOT PRESENT
TOTAL SCORE: 0
ANXIETY: NO ANXIETY (AT EASE)
HEADACHE, FULLNESS IN HEAD: NOT PRESENT
NAUSEA AND VOMITING: NO NAUSEA AND NO VOMITING
PAROXYSMAL SWEATS: NO SWEAT VISIBLE
NAUSEA AND VOMITING: NO NAUSEA AND NO VOMITING
TREMOR: NO TREMOR
AUDITORY DISTURBANCES: NOT PRESENT
TREMOR: NO TREMOR
ANXIETY: NO ANXIETY (AT EASE)
ANXIETY: MILDLY ANXIOUS
HEADACHE, FULLNESS IN HEAD: NOT PRESENT

## 2018-09-12 ASSESSMENT — PATIENT HEALTH QUESTIONNAIRE - PHQ9
3. TROUBLE FALLING OR STAYING ASLEEP OR SLEEPING TOO MUCH: NEARLY EVERY DAY
9. THOUGHTS THAT YOU WOULD BE BETTER OFF DEAD, OR OF HURTING YOURSELF: MORE THAN HALF THE DAYS
5. POOR APPETITE OR OVEREATING: NOT AT ALL
7. TROUBLE CONCENTRATING ON THINGS, SUCH AS READING THE NEWSPAPER OR WATCHING TELEVISION: SEVERAL DAYS
SUM OF ALL RESPONSES TO PHQ QUESTIONS 1-9: 14
SUM OF ALL RESPONSES TO PHQ9 QUESTIONS 1 AND 2: 2
2. FEELING DOWN, DEPRESSED, IRRITABLE, OR HOPELESS: SEVERAL DAYS
8. MOVING OR SPEAKING SO SLOWLY THAT OTHER PEOPLE COULD HAVE NOTICED. OR THE OPPOSITE, BEING SO FIGETY OR RESTLESS THAT YOU HAVE BEEN MOVING AROUND A LOT MORE THAN USUAL: NOT AT ALL
6. FEELING BAD ABOUT YOURSELF - OR THAT YOU ARE A FAILURE OR HAVE LET YOURSELF OR YOUR FAMILY DOWN: NEARLY EVERY DAY
4. FEELING TIRED OR HAVING LITTLE ENERGY: NEARLY EVERY DAY
1. LITTLE INTEREST OR PLEASURE IN DOING THINGS: SEVERAL DAYS

## 2018-09-12 ASSESSMENT — PAIN SCALES - GENERAL
PAINLEVEL_OUTOF10: 0

## 2018-09-12 NOTE — PROGRESS NOTES
Pt transferred to floor from ICU. Report received from Shivani BRUNSON. Pt oriented to room. Safety precautions in place, call light within reach.

## 2018-09-12 NOTE — H&P
Hospital Medicine History and Physical    Date of Service  9/11/2018    Chief Complaint  Chief Complaint   Patient presents with   • Suicidal   • Drug Overdose       History of Presenting Illness  42 y.o. female with a past medical history of PCOS, and mood disorder presented 9/11/2018 to the ER after taking unknown quantities of ativan with alcohol. This was apparently an intentional suicide attempt, with Her BAL on admit being 0.28. Initially patient was mildly hypotension and hypoxic, however upon my evaluation she is awake and alert but confuse. She is able to maintain her airway, however due to concern of decompensation patint will be admitted to the ICU for neuro checks and clinical monitoring.                  Primary Care Physician  MEEK Verdugo.    Consultants  Life skills    Code Status  Code: Full code    Review of Systems  Review of Systems   Unable to perform ROS: Mental status change          Past Medical History  Past Medical History:   Diagnosis Date   • PCOS (polycystic ovarian syndrome)        Surgical History  Past Surgical History:   Procedure Laterality Date   • HYSTERECTOMY ROBOTIC  8/15/2016    Procedure: HYSTERECTOMY ROBOTIC , BILATERAL SALPINGECTOMY;  Surgeon: Zuleika Vazquez M.D.;  Location: SURGERY [facility];  Service:    • CYSTOSCOPY  8/15/2016    Procedure: CYSTOSCOPY;  Surgeon: Zuleika Vazquez M.D.;  Location: SURGERY [facility];  Service:    • TUBAL REANASTOMOSIS  06/2015   • TUBAL LIGATION         Medications  No current facility-administered medications on file prior to encounter.      Current Outpatient Prescriptions on File Prior to Encounter   Medication Sig Dispense Refill   • Cholecalciferol (VITAMIN D) 2000 UNITS Cap Take 1 Cap by mouth every day. 30 Cap 0   • hydrocortisone (ANUSOL-HC) 25 MG Suppos Insert 1 Suppository in rectum every 12 hours. 28 Suppository 0       Family History  Family History   Problem Relation Age of Onset   • Diabetes Father 59  "  • Heart Disease Father    • Hypertension Father    • Hyperlipidemia Father    • Stroke Maternal Grandmother    • Cancer Maternal Grandfather         ? type cancer   • Heart Disease Paternal Grandfather 63       Social History  Social History   Substance Use Topics   • Smoking status: Never Smoker   • Smokeless tobacco: Never Used   • Alcohol use 8.4 oz/week     14 Glasses of wine per week      Comment: 4 drinks per week        Allergies  Allergies   Allergen Reactions   • Sudafed Unspecified     \"reverse effect\" makes mucous thicker   RXN=15 years ago stopped taking        Physical Exam  Laboratory   Hemodynamics  Temp (24hrs), Av.6 °C (97.8 °F), Min:36.6 °C (97.8 °F), Max:36.6 °C (97.8 °F)   Temperature: 36.6 °C (97.8 °F)  Pulse  Av.4  Min: 82  Max: 111 Heart Rate (Monitored): 91  NIBP: 115/72      Respiratory      Respiration: 14, Pulse Oximetry: 98 %             Physical Exam   Constitutional: She appears well-nourished. She appears distressed.   HENT:   Head: Normocephalic and atraumatic.   Mouth/Throat: No oropharyngeal exudate.   Eyes: Pupils are equal, round, and reactive to light. Conjunctivae are normal. Right eye exhibits no discharge. No scleral icterus.   Neck: Neck supple. No JVD present. No thyromegaly present.   Cardiovascular: Normal rate and intact distal pulses.    No murmur heard.  Pulmonary/Chest: Effort normal and breath sounds normal. No stridor. No respiratory distress. She has no wheezes. She has no rales.   Abdominal: Soft. Bowel sounds are normal. She exhibits no distension. There is no tenderness. There is no rebound.   Musculoskeletal: Normal range of motion. She exhibits no edema.   Neurological: She is alert. No cranial nerve deficit.   Skin: Skin is warm. She is not diaphoretic. No erythema.   Psychiatric:   Depressed, confused         Assessment/Plan  Hypoxemia   Assessment & Plan    Most likely 2/2 to hypoventilation from ETOH and benzos.  Continue RT protocol, duo nebs, " Pep therapy if warranted, and incentive spirometry.   continuous pulse ox in place         Overdose   Assessment & Plan    Intentional, unknown quantities of ativan and alcohol.  BAL On admit 0.28  Now more alert but still confused. Able to maintain airway but was hypoxic needing 2L on admit.  CTM.  Legal 2000 hold once patient no longer intoxicated.  Life skills consult placed.  Seizure precautions             I anticipate this patient will require at least two midnights for appropriate medical management, necessitating inpatient admission.    Prophylaxis: lovenox    Recent Labs      09/11/18   1728   WBC  8.1   RBC  4.40   HEMOGLOBIN  14.1   HEMATOCRIT  41.3   MCV  93.9   MCH  32.0   MCHC  34.1   RDW  43.3   PLATELETCT  237   MPV  9.2     Recent Labs      09/11/18   1728   SODIUM  140   POTASSIUM  3.6   CHLORIDE  110   CO2  19*   GLUCOSE  104*   BUN  13   CREATININE  0.73   CALCIUM  8.3*     Recent Labs      09/11/18   1728   GLUCOSE  104*                 Lab Results   Component Value Date    TROPONINI 0.01 09/22/2013       Imaging  DX-CHEST-PORTABLE (1 VIEW)    (Results Pending)

## 2018-09-12 NOTE — PROGRESS NOTES
"Called pt mother per her request spoke with Corinne and updated her that pt would like to know if she was coming in. Corinne advised that she would be in this AM. Pt denies any SI at this time. 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 She states the reason she is here is for \"taking some pills\", pt states that there was \"not that many in the bottle\". Pt states that she is \"feeling better\" this AM. Denies pain. Has some nausea but declines medication at this time. 1:1 remains. Discussed POC with pt.   "

## 2018-09-12 NOTE — PROGRESS NOTES
"During admission to unit, pt asked if her stomach was pumped and this RN said no. Patient then stated \" I thought 15 pills would be enough\". Md is aware.  "

## 2018-09-12 NOTE — PROGRESS NOTES
"S/w Dr. Arellano at 2300 regarding pt suicide ideation, pt wanting her stomach pumped and pt statement of \" I thought 15 pill would be enough\". Dr. arellano wanted a legal hold order. I s/w Dr. Macario in ER regarding Dr. Arellano's request to have the hold signed tonight. Dr. Macario stated that the  pt was too intoxicated so the hold will wait until the am if necessary. Dr. Arellano is aware and ok for consult to .  "

## 2018-09-12 NOTE — CONSULTS
"Critical Care/Pulmonary Consultation    Date of Service: 9/12/2018    Date of Admission:  9/11/2018  5:19 PM    Consulting Physician: MARIELA Perez*    Chief Complaint:  Suicidal and Drug Overdose      History of Present Illness: Shawna Walker is a 42 y.o. female with a past medical history of chronic alcohol use and previous suicidal attempt 2000 presented last night to our ER following ingestion of 15 pills of lorazepam \"0.5 mg\" and alcohol. Her ETOH level was 0.28 and initially she was not cooperative. She has no active complaints this AM but has no recollection of last night's events after the ingestion. She drinks one bottle of white wine daily. She is supposed to start a new job tomorrow. She denies fever, cough or dyspnea.     Review of Systems   Constitutional: Negative for chills, diaphoresis, fever, malaise/fatigue and weight loss.   HENT: Negative for congestion, hearing loss, nosebleeds, sinus pain and sore throat.    Eyes: Negative for blurred vision and double vision.   Respiratory: Negative for cough, hemoptysis, sputum production, shortness of breath, wheezing and stridor.    Cardiovascular: Negative for chest pain, palpitations and leg swelling.   Gastrointestinal: Negative for abdominal pain, diarrhea, heartburn, nausea and vomiting.   Genitourinary: Negative for dysuria and urgency.   Musculoskeletal: Negative for myalgias and neck pain.   Skin: Negative for itching and rash.   Neurological: Negative for dizziness, speech change and headaches.       Home Medications     Reviewed by Ariadne Seo R.N. (Registered Nurse) on 09/12/18 at 0141  Med List Status: <None>   Medication Last Dose Status   Cholecalciferol (VITAMIN D) 2000 UNITS Cap 9/11/2018 Active   FLUoxetine (PROZAC) 20 MG Cap 9/11/2018 Active   hydrocortisone (ANUSOL-HC) 25 MG Suppos  Active   LORazepam (ATIVAN) 1 MG Tab 9/11/2018 Active   therapeutic multivitamin-minerals (THERAGRAN-M) Tab 9/11/2018 Active              Family " "History   Problem Relation Age of Onset   • Diabetes Father 59   • Heart Disease Father    • Hypertension Father    • Hyperlipidemia Father    • Stroke Maternal Grandmother    • Cancer Maternal Grandfather         ? type cancer   • Heart Disease Paternal Grandfather 63         Social History   Substance Use Topics   • Smoking status: Never Smoker   • Smokeless tobacco: Never Used   • Alcohol use 8.4 oz/week     14 Glasses of wine per week      Comment: one bottle of wine a night         Past Medical History:   Diagnosis Date   • PCOS (polycystic ovarian syndrome)        Past Surgical History:   Procedure Laterality Date   • HYSTERECTOMY ROBOTIC  8/15/2016    Procedure: HYSTERECTOMY ROBOTIC , BILATERAL SALPINGECTOMY;  Surgeon: Zuleika Vazquez M.D.;  Location: SURGERY [facility];  Service:    • CYSTOSCOPY  8/15/2016    Procedure: CYSTOSCOPY;  Surgeon: Zuleika Vazquez M.D.;  Location: SURGERY [facility];  Service:    • TUBAL REANASTOMOSIS  06/2015   • TUBAL LIGATION         Allergies: Sudafed    Family History   Problem Relation Age of Onset   • Diabetes Father 59   • Heart Disease Father    • Hypertension Father    • Hyperlipidemia Father    • Stroke Maternal Grandmother    • Cancer Maternal Grandfather         ? type cancer   • Heart Disease Paternal Grandfather 63     Encounter Vitals  Standard Vitals  Vitals  Blood Pressure: (!) 95/55  Temperature: 36.9 °C (98.5 °F)  Pulse: 70  Respiration: (!) 42  Pulse Oximetry: 93 %  Height: 172.7 cm (5' 7.99\")  Weight: 88.6 kg (195 lb 5.2 oz)              Physical Examination  General: Looks well, comfortable in bed not in distress.   Neuro/Psych: Conscious, alert and oriented ×4, follows commands and fully verbal.  HEENT: No cervical or supraclavicular lymphadenopathy, trachea midline and no thyromegaly  CVS: Normal S1 plus S2 with no murmurs  Respiratory: Good air entry bilaterally with no added sounds  Abdomen/: Soft lax nontender, no " organomegaly  Extremities: No swelling or erythema, intact peripheral pulses  Skin: No rash or erythema.      Intake/Output Summary (Last 24 hours) at 09/12/18 0625  Last data filed at 09/12/18 0400   Gross per 24 hour   Intake             1502 ml   Output              410 ml   Net             1092 ml       Recent Labs      09/11/18   1728  09/12/18   0330   WBC  8.1  7.2   NEUTSPOLYS  45.00  63.00   LYMPHOCYTES  43.70*  26.50   MONOCYTES  7.80  7.80   EOSINOPHILS  2.90  2.00   BASOPHILS  0.50  0.40   ASTSGOT  20  17   ALTSGPT  20  15   ALKPHOSPHAT  48  42   TBILIRUBIN  0.5  0.3     Recent Labs      09/11/18   1728  09/12/18   0330   SODIUM  141  140  139   POTASSIUM  3.7  3.6  3.6   CHLORIDE  111  110  111   CO2  22  19*  21   BUN  10  13  12   CREATININE  0.78  0.73  0.67   MAGNESIUM  2.2  1.9   PHOSPHORUS   --   2.8   CALCIUM  8.4  8.3*  7.4*     Recent Labs      09/11/18   1728  09/12/18   0330   ALTSGPT  20  15   ASTSGOT  20  17   ALKPHOSPHAT  48  42   TBILIRUBIN  0.5  0.3   GLUCOSE  106*  104*  80           Invalid input(s): [id number]  DX-CHEST-PORTABLE (1 VIEW)   Final Result      No acute cardiac or pulmonary abnormalities are identified.          Patient Active Problem List   Diagnosis   • Allergy to pollen   • PCOS (polycystic ovarian syndrome)   • Obesity (BMI 30-39.9)   • Overdose   • Hypoxemia   • Acute alcoholic intoxication with delirium (HCC)     Chest x-ray yesterday  FINDINGS:  Heart size is within normal limits.  No pulmonary infiltrates or consolidations are noted.  No pleural abnormalities are noted.      Assessment and Plan:  1. Acute alcohol intoxication   Level was 0.28 on admission last night  Urine tox screen negative, despite patient stating that she took 15 pills of Lorazepam.  Tylenol was negative  Currently patient is fully awake, vitals stable and not requiring oxygen  Continue supportive measures  Psychiatry consultation  Multivitamins  May proceed to oral diet    2.   Chronic alcohol use  Withdrawal precautions    3.  Mood disorder on Prozac  Psychiatric consultation per above    4.  Polycystic ovarian syndrome  5.  Overweight; BMI 29      May transfer to [facility] this afternoon

## 2018-09-12 NOTE — CARE PLAN
Problem: Safety  Goal: Will remain free from injury  Outcome: PROGRESSING AS EXPECTED    Intervention: Provide assistance with mobility  RN educated pt to call for assistance out of bed

## 2018-09-12 NOTE — PROGRESS NOTES
Paged Dr. Shetty regarding low temperature of 96.5f. No orders at this time. Will continue to monitor.

## 2018-09-12 NOTE — PROGRESS NOTES
Plan of care reviewed with patient. Patient is alert and oriented times 4. Forgetful at time, repeats questions regarding going home and wants to ensure that her mother is aware that she is in the ICU. Mentation has improved since admit as well as nystagmus of the right eye. Sitter at the bedside. No acute events this shift. See flow sheets for further information.

## 2018-09-12 NOTE — PROGRESS NOTES
Order to d/c 1:1 sitter at this time. Pt to be q30min check while on floors. RN and CNA frequently checking on pt. Pt sitting in chair pleasant, denies pain or self harm at this time. Updated on plan of care tele psych for 9/13 at 1300 per social work.

## 2018-09-12 NOTE — PROGRESS NOTES
Pt transferred to floor with all belongings given to RN. Report given to Anita. Pt denies any pain at time of transfer and denies SI. No further questions at this time. IVF infusing without complication. VSS.

## 2018-09-12 NOTE — PROGRESS NOTES
Assessment complete, ice and ginger ale provided. Discussed POC, allowed time to ask questions. Pt resting in bed, RR even and unlabored. Pt educated to call for assistance. Declines needs at this time. Safety precautions in place.

## 2018-09-12 NOTE — CARE PLAN
Problem: Safety  Goal: Will remain free from injury  Outcome: PROGRESSING AS EXPECTED  All safety measures in place including seizure precautions, fall, SI and aspiration. Pt remains 1:1 sitter. Bed in low and locked position call bell within reach. All belongings removed and no unnecessary equipment at bedside.     Problem: Psychosocial Needs:  Goal: Level of anxiety will decrease  Outcome: PROGRESSING AS EXPECTED  Pt appears to be resting comfortably at this time. Pt denies any SI. Updated on POC and education given on coping skills.

## 2018-09-12 NOTE — PROGRESS NOTES
Received report from Ariadne BRUNSON. Pt denies any pain at this time, bed in low and locked position bedside table and call bell within reach. Pt remains 1:1 sitter. IVF infusing without complication.

## 2018-09-12 NOTE — ASSESSMENT & PLAN NOTE
Ms. Walker was here with a suspected overdose of T42 - Antiepileptic, sedative-hypnotic, or anti-Parkinsonism drugs; she has other known overdoses: Unknown substance or drug ALcohol.

## 2018-09-12 NOTE — ED NOTES
Patient bib [facility] after taking unknown amount of Lorazepam and alcohol. Per report pt has had previous suicidal attempts in the past. Patient currently A&OX1, somnolent,  Sinus tach, /65, 92% on RA

## 2018-09-12 NOTE — ED NOTES
"Pt a&ox4, but forgetful at this time; pt requiring repeated reorientation and is repetitive including \"where's my mother?\", \"where's my uncle?\" and inquiries about her children. pt states she drank alcohol, took ativan of unknown quantity pta. Pt tearful, depressed at this time. Pt states she is \"self-medicating\" through alcohol.   "

## 2018-09-12 NOTE — CARE PLAN
Problem: Psychosocial Needs:  Goal: Level of anxiety will decrease  Outcome: PROGRESSING AS EXPECTED  Rn educated pt on the plan of care

## 2018-09-12 NOTE — ED PROVIDER NOTES
"CHIEF COMPLAINT  Chief Complaint   Patient presents with   • Suicidal   • Drug Overdose       HPI  HPI   42 y.o. F w/ PMHx of suicide attempt in 2000 BIBA w/ CC of SI.    EMS reports Pt reported to have taken ativan and alcohol in attempt at self harm.  Pt found by family.    Pt was reported to have vomited prior to EMS arrival.   Pt w/ three bottles nearby:  40mg  Fluoxetine 30 tabs total w/ only 4 remaining  20mg Fluoxetine 30 capsules w/ 1 remaining  0.5mg ativan 15 tabs w/ none remaining    No e/o or reported trauma  Pt is non-cooperative w/ exam or history      REVIEW OF SYSTEMS  Review of Systems   Unable to perform ROS: Mental status change       PAST MEDICAL HISTORY   has a past medical history of PCOS (polycystic ovarian syndrome).    SOCIAL HISTORY  Social History     Social History Main Topics   • Smoking status: Never Smoker   • Smokeless tobacco: Never Used   • Alcohol use 8.4 oz/week     14 Glasses of wine per week      Comment: one bottle of wine a night    • Drug use: No   • Sexual activity: Not on file       SURGICAL HISTORY   has a past surgical history that includes tubal ligation; tubal reanastomosis (06/2015); hysterectomy robotic (8/15/2016); and cystoscopy (8/15/2016).    CURRENT MEDICATIONS  Home Medications     Reviewed by Ariadne Seo R.N. (Registered Nurse) on 09/12/18 at 0141  Med List Status: <None>   Medication Last Dose Status   Cholecalciferol (VITAMIN D) 2000 UNITS Cap 9/11/2018 Active   FLUoxetine (PROZAC) 20 MG Cap 9/11/2018 Active   hydrocortisone (ANUSOL-HC) 25 MG Suppos  Active   LORazepam (ATIVAN) 1 MG Tab 9/11/2018 Active   therapeutic multivitamin-minerals (THERAGRAN-M) Tab 9/11/2018 Active                ALLERGIES  Allergies   Allergen Reactions   • Sudafed Unspecified     \"reverse effect\" makes mucous thicker   RXN=15 years ago stopped taking       PHYSICAL EXAM  VITAL SIGNS: BP (!) 95/55   Pulse 87   Temp 36.9 °C (98.4 °F)   Resp 14   Ht 1.727 m (5' 7.99\")   Wt " "88.6 kg (195 lb 5.2 oz)   LMP 09/01/2015   SpO2 93%   Breastfeeding? No   BMI 29.71 kg/m²  @CHARLIE[[id number]::@  Pulse ox interpretation: I interpret this pulse ox as abnormal.    Physical Exam   Constitutional: She is well-developed, well-nourished, and in no distress.   HENT:   Head: Normocephalic and atraumatic.   Right Ear: External ear normal.   Left Ear: External ear normal.   Eyes: Conjunctivae and EOM are normal. No scleral icterus.   PERRL 4mm and reactive b/l   Neck: Normal range of motion.   Cardiovascular: Normal rate.    Pulmonary/Chest: Effort normal. No stridor. No respiratory distress. She has no wheezes.   Abdominal: She exhibits no distension. There is no tenderness.   Musculoskeletal: Normal range of motion. She exhibits no edema or deformity.   Neurological: She is alert.   Speaks complete sentences upon stimulus : \"leave me alone\"   Skin: Skin is warm and dry. No rash noted. No erythema.   Psychiatric: Affect and judgment normal.   No clonus  No track marks  No obvious abrasions or lacerations    DIAGNOSTIC STUDIES / PROCEDURES    LABS/EKG  Results for orders placed or performed during the hospital encounter of 09/11/18   CBC WITH DIFFERENTIAL   Result Value Ref Range    WBC 8.1 4.8 - 10.8 K/uL    RBC 4.40 4.20 - 5.40 M/uL    Hemoglobin 14.1 12.0 - 16.0 g/dL    Hematocrit 41.3 37.0 - 47.0 %    MCV 93.9 81.4 - 97.8 fL    MCH 32.0 27.0 - 33.0 pg    MCHC 34.1 33.6 - 35.0 g/dL    RDW 43.3 35.9 - 50.0 fL    Platelet Count 237 164 - 446 K/uL    MPV 9.2 9.0 - 12.9 fL    Neutrophils-Polys 45.00 44.00 - 72.00 %    Lymphocytes 43.70 (H) 22.00 - 41.00 %    Monocytes 7.80 0.00 - 13.40 %    Eosinophils 2.90 0.00 - 6.90 %    Basophils 0.50 0.00 - 1.80 %    Immature Granulocytes 0.10 0.00 - 0.90 %    Nucleated RBC 0.00 /100 WBC    Neutrophils (Absolute) 3.62 2.00 - 7.15 K/uL    Lymphs (Absolute) 3.52 1.00 - 4.80 K/uL    Monos (Absolute) 0.63 0.00 - 0.85 K/uL    Eos (Absolute) 0.23 0.00 - 0.51 K/uL    Baso " (Absolute) 0.04 0.00 - 0.12 K/uL    Immature Granulocytes (abs) 0.01 0.00 - 0.11 K/uL    NRBC (Absolute) 0.00 K/uL   BASIC METABOLIC PANEL   Result Value Ref Range    Sodium 140 135 - 145 mmol/L    Potassium 3.6 3.6 - 5.5 mmol/L    Chloride 110 96 - 112 mmol/L    Co2 19 (L) 20 - 33 mmol/L    Glucose 104 (H) 65 - 99 mg/dL    Bun 13 8 - 22 mg/dL    Creatinine 0.73 0.50 - 1.40 mg/dL    Calcium 8.3 (L) 8.4 - 10.2 mg/dL    Anion Gap 11.0 0.0 - 11.9   VENOUS BLOOD GAS   Result Value Ref Range    Venous Bg Ph 7.39 7.31 - 7.45    Venous Bg Pco2 30.9 (L) 41.0 - 51.0 mmHg    Venous Bg Po2 101.9 (H) 25.0 - 40.0 mmHg    Venous Bg O2 Saturation 97.4 %    Venous Bg Hco3 18 (L) 24 - 28 mmol/L    Venous Bg Base Excess -6 mmol/L    Body Temp see below Centigrade   HCG Qual Serum   Result Value Ref Range    Beta-Hcg Qualitative Serum Negative Negative   Blood Alcohol   Result Value Ref Range    Diagnostic Alcohol 0.28 (H) 0.00 g/dL   Acetaminophen Level   Result Value Ref Range    Acetaminophen -Tylenol <10 10 - 30 ug/mL   SALICYLATE LEVEL   Result Value Ref Range    Salicylates, Quant. <4 (L) 15 - 25 mg/dL   ESTIMATED GFR   Result Value Ref Range    GFR If African American >60 >60 mL/min/1.73 m 2    GFR If Non African American >60 >60 mL/min/1.73 m 2   COMP METABOLIC PANEL   Result Value Ref Range    Sodium 141 135 - 145 mmol/L    Potassium 3.7 3.6 - 5.5 mmol/L    Chloride 111 96 - 112 mmol/L    Co2 22 20 - 33 mmol/L    Anion Gap 8.0 0.0 - 11.9    Glucose 106 (H) 65 - 99 mg/dL    Bun 10 8 - 22 mg/dL    Creatinine 0.78 0.50 - 1.40 mg/dL    Calcium 8.4 8.4 - 10.2 mg/dL    AST(SGOT) 20 12 - 45 U/L    ALT(SGPT) 20 2 - 50 U/L    Alkaline Phosphatase 48 30 - 99 U/L    Total Bilirubin 0.5 0.1 - 1.5 mg/dL    Albumin 4.0 3.2 - 4.9 g/dL    Total Protein 6.8 6.0 - 8.2 g/dL    Globulin 2.8 1.9 - 3.5 g/dL    A-G Ratio 1.4 g/dL   TSH (Thyroid Stimulating Hormone)   Result Value Ref Range    TSH 1.070 0.380 - 5.330 uIU/mL   Magnesium   Result  Value Ref Range    Magnesium 2.2 1.5 - 2.5 mg/dL   ESTIMATED GFR   Result Value Ref Range    GFR If African American >60 >60 mL/min/1.73 m 2    GFR If Non African American >60 >60 mL/min/1.73 m 2   UR DRUG SCREEN([facility] ONLY)   Result Value Ref Range    Phencyclidine -Pcp Negative Negative    Benzodiazepines Negative Negative    Cocaine Metabolite Negative Negative    Amphetamines By Triage Negative Negative    Urine THC Negative Negative    Codeine-Morphine Negative Negative    Barbiturates Negative Negative   EKG (NOW)   Result Value Ref Range    Report       [facility] Emergency Dept.    Test Date:  2018  Pt Name:    DALLAS ZHAO                 Department: EDSM  MRN:        [id number]                      Room:       -ROOM 10  Gender:     Female                       Technician: [id number]  :        [id number]                   Requested By:VIRGINIA WARREN  Order #:    [id number]                    Reading MD:    Measurements  Intervals                                Axis  Rate:       98                           P:          47  AZ:         174                          QRS:        -3  QRSD:       88                           T:          17  QT:         353  QTc:        451    Interpretive Statements  Sinus rhythm  LVH with secondary repolarization abnormality  Compared to ECG 2013 12:27:41  Left ventricular hypertrophy now present  Early repolarization now present  Sinus bradycardia no longer present       12 Lead EKG interpreted by me to show:  Normal sinus rhythm  Rate 98  Axis: Normal  Intervals: Normal  Normal T waves  Normal ST segments  My impression of this EKG: No STEMI, no QRS widening.  LVH w/ secondary repol abnormality    RADIOLOGY  DX-CHEST-PORTABLE (1 VIEW)   Final Result      No acute cardiac or pulmonary abnormalities are identified.           COURSE & MEDICAL DECISION MAKING  Pertinent Labs & Imaging studies reviewed by me. (See chart for details)    42 y.o.  female PMH SI p/w SI ingestion of benzo and ETOH.     Differential diagnosis includes but is not limited to:  #encephalopathy  Likely 2/2 ETOH and benzo OD  No clear e/o rigidity or increased reflexes or clonus to suggest serotonin syndrome and pt w/ clearing in ED making serotonin syndrome less likely    ASA/APAP negative for ingestion.  EKG without cardiac conduction abnormality.     Throughout ED stay pt mentation continued to improve from responsive to sternal rub only on arrival to responsive to voice and progressed to eyes open   Airway supplies ready if pt were to decompensate early in course or vomit w/ decreased mentation.    No hold was placed at this time given intoxication, however pt w/ sitter at bedside.    Intravenous fluids administered for dehydration and low BP  On repeat evaluation, BP improved and pt more alert    The total critical care time on this patient is 40 minutes, resuscitating patient, speaking with admitting physician, and deciphering test results. This 40 minutes is exclusive of separately billable procedures.       FINAL IMPRESSION  Visit Diagnoses     ICD-10-CM   1. Intentional benzodiazepine overdose, initial encounter ([facility]) T42.4X2A   2. Toxic encephalopathy G92              Electronically signed by: Dat Macario, 9/11/2018 5:22 PM

## 2018-09-12 NOTE — PROGRESS NOTES
Spoke with Dr. Jay pt ok to have cell phone and immediate visitors. Mother and daughter at bedside at this time.

## 2018-09-12 NOTE — FLOWSHEET NOTE
09/12/18 1227   Events/Summary/Plan   Events/Summary/Plan RCP initial assessment   Chest Exam   Work Of Breathing / Effort Mild   Respiration 18   Pulse 69   Heart Rate (Monitored) 69   Breath Sounds   RUL Breath Sounds Clear   RML Breath Sounds Clear   RLL Breath Sounds Diminished   MARY Breath Sounds Clear   LLL Breath Sounds Diminished   Oximetry   #Pulse Oximetry (Single Determination) Yes   Oxygen   Home O2 Use Prior To Admission? No   Pulse Oximetry 94 %   O2 Daily Delivery Respiratory  Room Air with O2 Available

## 2018-09-12 NOTE — ASSESSMENT & PLAN NOTE
Patient drank a lot prior to admission but normally only drinks 2 glasses of wine per day  She says she used to drink a lot more but then after she started medications it improved

## 2018-09-12 NOTE — ED NOTES
Report received from Celi BRUNSON. Assumed care of pt. Pt awake, alert, with ED tech at bedside. Pt visible at all times.

## 2018-09-13 ENCOUNTER — APPOINTMENT (OUTPATIENT)
Dept: SCHEDULING | Facility: IMAGING CENTER | Age: 43
End: 2018-09-13
Payer: COMMERCIAL

## 2018-09-13 ENCOUNTER — HOSPITAL ENCOUNTER (OUTPATIENT)
Facility: MEDICAL CENTER | Age: 43
End: 2018-09-13
Attending: PSYCHIATRY & NEUROLOGY
Payer: COMMERCIAL

## 2018-09-13 VITALS
DIASTOLIC BLOOD PRESSURE: 71 MMHG | HEIGHT: 68 IN | TEMPERATURE: 98 F | BODY MASS INDEX: 28.5 KG/M2 | WEIGHT: 188.05 LBS | RESPIRATION RATE: 18 BRPM | SYSTOLIC BLOOD PRESSURE: 122 MMHG | OXYGEN SATURATION: 96 % | HEART RATE: 69 BPM

## 2018-09-13 LAB
MAGNESIUM SERPL-MCNC: 1.9 MG/DL (ref 1.5–2.5)
PHOSPHATE SERPL-MCNC: 2.1 MG/DL (ref 2.5–4.5)

## 2018-09-13 PROCEDURE — 84100 ASSAY OF PHOSPHORUS: CPT

## 2018-09-13 PROCEDURE — 700111 HCHG RX REV CODE 636 W/ 250 OVERRIDE (IP): Performed by: HOSPITALIST

## 2018-09-13 PROCEDURE — 99239 HOSP IP/OBS DSCHRG MGMT >30: CPT | Performed by: INTERNAL MEDICINE

## 2018-09-13 PROCEDURE — 83735 ASSAY OF MAGNESIUM: CPT

## 2018-09-13 PROCEDURE — A9270 NON-COVERED ITEM OR SERVICE: HCPCS | Performed by: HOSPITALIST

## 2018-09-13 PROCEDURE — 700102 HCHG RX REV CODE 250 W/ 637 OVERRIDE(OP): Performed by: HOSPITALIST

## 2018-09-13 RX ADMIN — ENOXAPARIN SODIUM 40 MG: 100 INJECTION SUBCUTANEOUS at 05:26

## 2018-09-13 RX ADMIN — FOLIC ACID 1 MG: 1 TABLET ORAL at 05:26

## 2018-09-13 RX ADMIN — Medication 100 MG: at 05:26

## 2018-09-13 RX ADMIN — THERA TABS 1 TABLET: TAB at 05:26

## 2018-09-13 ASSESSMENT — LIFESTYLE VARIABLES
NAUSEA AND VOMITING: NO NAUSEA AND NO VOMITING
HEADACHE, FULLNESS IN HEAD: NOT PRESENT
TOTAL SCORE: 0
TREMOR: NO TREMOR
VISUAL DISTURBANCES: NOT PRESENT
PAROXYSMAL SWEATS: NO SWEAT VISIBLE
AUDITORY DISTURBANCES: NOT PRESENT
ANXIETY: NO ANXIETY (AT EASE)
TREMOR: NO TREMOR
ORIENTATION AND CLOUDING OF SENSORIUM: ORIENTED AND CAN DO SERIAL ADDITIONS
AUDITORY DISTURBANCES: NOT PRESENT
AGITATION: NORMAL ACTIVITY
ANXIETY: NO ANXIETY (AT EASE)
TREMOR: NO TREMOR
AGITATION: NORMAL ACTIVITY
AUDITORY DISTURBANCES: NOT PRESENT
HEADACHE, FULLNESS IN HEAD: NOT PRESENT
ORIENTATION AND CLOUDING OF SENSORIUM: ORIENTED AND CAN DO SERIAL ADDITIONS
PAROXYSMAL SWEATS: NO SWEAT VISIBLE
AGITATION: NORMAL ACTIVITY
TOTAL SCORE: 0
VISUAL DISTURBANCES: NOT PRESENT
ORIENTATION AND CLOUDING OF SENSORIUM: ORIENTED AND CAN DO SERIAL ADDITIONS
VISUAL DISTURBANCES: NOT PRESENT
NAUSEA AND VOMITING: NO NAUSEA AND NO VOMITING
TOTAL SCORE: 0
HEADACHE, FULLNESS IN HEAD: NOT PRESENT
PAROXYSMAL SWEATS: NO SWEAT VISIBLE
NAUSEA AND VOMITING: NO NAUSEA AND NO VOMITING
ANXIETY: NO ANXIETY (AT EASE)

## 2018-09-13 ASSESSMENT — PAIN SCALES - GENERAL
PAINLEVEL_OUTOF10: 0
PAINLEVEL_OUTOF10: 0

## 2018-09-13 NOTE — PROGRESS NOTES
Assessment complete, medicated per MAR. Discussed POC and Tele/psych conference, pt aware she will go to another room for conference, allowed time to ask questions. Pt resting in bed, RR even and unlabored. Pt educated to call for assistance. Declines needs at this time. Safety precautions in place.
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
## 2018-09-13 NOTE — PROGRESS NOTES
2100 Patient's PIV infiltrated, new one was placed on right forearm, patient tolerated well. Assisted patient to the bathroom. Medicated per MAR.    2300 Patient sleeping comfortably. Bed alarm is on, safety precautions in place.     0155 Patient attempted to get out of bed, bed alarm went off. Patient stated she needed to use bathroom. CNA assisted patient to bathroom and back to bed. Safety precautions in place, bed alarm on.

## 2018-09-13 NOTE — PROGRESS NOTES
Tele/psych conference complete, pt back in room. Sitting on bed watching TV. Declines needs at this time.

## 2018-09-13 NOTE — PROGRESS NOTES
Bedside report given to Estefania and Carisa BRUNSON. POC discussed. Pt resting comfortably in bed. Safety precautions in place. Q30 min checks in place per order.

## 2018-09-13 NOTE — DISCHARGE INSTRUCTIONS
"Discharge Instructions    Discharged to home by car with relative. Discharged via wheelchair, hospital escort: Yes.  Special equipment needed: Not Applicable    Be sure to schedule a follow-up appointment with your primary care doctor or any specialists as instructed.     Discharge Plan:   Diet Plan: Discussed  Activity Level: Discussed  Confirmed Follow up Appointment: Patient to Call and Schedule Appointment  Confirmed Symptoms Management: Discussed  Medication Reconciliation Updated: Yes  Pneumococcal Vaccine Administered/Refused: Not given - Patient refused pneumococcal vaccine  Influenza Vaccine Indication: Patient Refuses    I understand that a diet low in cholesterol, fat, and sodium is recommended for good health. Unless I have been given specific instructions below for another diet, I accept this instruction as my diet prescription.   Other diet: Regular    Special Instructions:   Overdose, Adult  A person can overdose on alcohol, drugs or both by accident or on purpose. If it was on purpose, it is a serious matter. Professional help should be sought. If the overdose was an accident, certain steps should be taken to make sure that it never happens again.  ACCIDENTAL OVERDOSE  Overdosing on prescription medications can be a result of:  · Not understanding the instructions.  · Misreading the label.  · Forgetting that you took a dose and then taking another by mistake. This situation happens a lot.  To make sure this does not happen again:  · Clarify the correct dosage with your caregiver.  · Place the correct dosage in a \"pill-minder\" container (labeled for each day and time of day).  · Have someone dispense your medicine.  Please be sure to follow-up with your primary care doctor as directed.  INTENTIONAL OVERDOSE  If the overdose was on purpose, it is a serious situation. Taking more than the prescribed amount of medications (including taking someone else's prescription), abusing street drugs or drinking an " "amount of alcohol that requires medical treatment can show a variety of possible problems. These may indicate you:  · Are depressed or suicidal.  · Are abusing drugs, took too much or combined different drugs to experiment with the effects.  · Mixed alcohol with drugs and did not realize the danger of doing so (this is drug abuse).  · Are suffering addiction to drugs and/or alcohol (also known as chemical dependency).  · Binge drink.  If you have not been referred to a mental health professional for help, it is important that you get help right away. Only a professional can determine which problems may exist and what the best course of treatment may be. It is your responsibility to follow-up with further evaluation or treatment as directed.   Alcohol is responsible for a large number of overdoses and unintended deaths among college-age young adults. Binge drinking is consuming 4-5 drinks in a short period of time. The amount of alcohol in standard servings of wine (5 oz.), beer (12 oz.) and distilled spirits (1.5 oz., 80 proof) is the same. Beer or wine can be just as dangerous to the binge drinker as \"hard\" liquor can be.   CONSEQUENCES OF BINGE DRINKING  Alcohol poisoning is the most serious consequence of binge drinking. This is a severe and potentially fatal physical reaction to an alcohol overdose. When too much alcohol is consumed, the brain does not get enough oxygen. The lack of oxygen will eventually cause the brain to shut down the voluntary functions that regulate breathing and heart rate. Symptoms of alcohol poisoning include:  · Vomiting.  · Passing out (unconsciousness).  · Cold, clammy, pale or bluish skin.  · Slow or irregular breathing.  WHAT SHOULD I DO NEXT?  If you have a history of drug abuse or suffer chemical dependency (alcoholism, drug addiction or both), you might consider the following:  · Talk with a qualified substance abuse counselor and consider entering a treatment program.  · Go to a " "detox facility if necessary.  · If you were attending self-help group meetings, consider returning to them and go often.  · Explore other resources located near you (see sources listed below).  If you are unsure if you have a substance abuse problem, ask yourself the following questions:  · Have you been told by friends or family that drugs/alcohol has become a problem?  · Do you get into fights when drinking or using drugs?  · Do you have blackouts (not remembering what you do while using)?  · Do you lie about use or amounts of drugs or alcohol you consume?  · Do you need chemicals to get you going?  · Do you suffer in work or school performance because of drug or alcohol use?  · Do you get sick from drug or alcohol use but continue to use anyway?  · Do you need drugs or alcohol to relate to people or feel comfortable in social situations?  · Do you use drugs or alcohol to forget problems?  If you answered \"Yes\" to any of the above questions, it means you show signs of chemical dependency and a professional evaluation is suggested. The longer the use of drugs and alcohol continues, the problems will become greater.  SEEK IMMEDIATE MEDICAL CARE IF:   · You feel like you might repeat your problematic behavior.  · You need someone to talk to and feel that it should not wait.  · You feel you are a danger to yourself or someone else.  · You feel like you are having a new reaction to medications you are taking, or you are getting worse after leaving a care center.  · You have an overwhelming urge to drink or use drugs.  Addiction cannot be cured, but it can be treated successfully. Treatment centers are listed in the yellow pages under: Cocaine, Narcotics, and Alcoholics Anonymous. Most hospitals and clinics can refer you to a specialized care center. The [place] government maintains a toll-free number for obtaining treatment referrals: 1-193.910.5566 or 1-154.785.1793 (TDD) and maintains a website: " http://findtreatment.samhsa.gov. Other websites for additional information are: www.mentalhealth.samhsa.gov. and www.[place].gov.  In [place] treatment resources are listed in each Province with listings available under [employer] or similar titles.  Document Released: 12/20/2004 Document Revised: 03/11/2013 Document Reviewed: 11/11/2009  ExitCare® Patient Information ©2014 [employer].      · Is patient discharged on Warfarin / Coumadin?   No     Depression / Suicide Risk    As you are discharged from this [facility] facility, it is important to learn how to keep safe from harming yourself.    Recognize the warning signs:  · Abrupt changes in personality, positive or negative- including increase in energy   · Giving away possessions  · Change in eating patterns- significant weight changes-  positive or negative  · Change in sleeping patterns- unable to sleep or sleeping all the time   · Unwillingness or inability to communicate  · Depression  · Unusual sadness, discouragement and loneliness  · Talk of wanting to die  · Neglect of personal appearance   · Rebelliousness- reckless behavior  · Withdrawal from people/activities they love  · Confusion- inability to concentrate     If you or a loved one observes any of these behaviors or has concerns about self-harm, here's what you can do:  · Talk about it- your feelings and reasons for harming yourself  · Remove any means that you might use to hurt yourself (examples: pills, rope, extension cords, firearm)  · Get professional help from the community (Mental Health, Substance Abuse, psychological counseling)  · Do not be alone:Call your Safe Contact- someone whom you trust who will be there for you.  · Call your local CRISIS HOTLINE 494-6152 or 281-133-0079  · Call your local Children's Mobile Crisis Response Team [place] (903) 485-6131 or www.[place]  · Call the toll free National Suicide Prevention Hotlines   · National Suicide  Prevention Lifeline 018-930-YXZM (6004)  · National [place] Line Network 800-SUICIDE (842-7172)

## 2018-09-13 NOTE — PROGRESS NOTES
Bedside report given to NANNETTE North and NANNETTE Morales. Plan of care discussed. Declines any needs at this time. Safety precautions in place.

## 2018-09-13 NOTE — CARE PLAN
Problem: Knowledge Deficit  Goal: Knowledge of disease process/condition, treatment plan, diagnostic tests, and medications will improve  Outcome: PROGRESSING AS EXPECTED  Discussed plan of care with patient, verbalized understanding. Will continue to monitor.

## 2018-09-13 NOTE — PROGRESS NOTES
[facility] Hospitalist Progress Note    Date of Service: 2018    Chief Complaint  42 y.o. female with prior history of alcohol abuse and depression currently on Prozac admitted 2018 with alcohol intoxication and intentional overdose of Ativan.  For whatever reason benzodiazepines did not appear on her toxicology screen.  She was hypoxic and admitted to the ICU for observation overnight.    Interval Problem Update  Hypoxemia alertness and mentation improved overnight  She denies ongoing depression or suicidal thoughts  She states she did this because she was gambling in [place] [employer] and started to lose a large amount of money which prompted her to muro more and drink more and she lost more money and then she swallowed the pills.  She denies any ongoing suicidal thoughts or abusive relationships she realizes that her actions were irrational.    Consultants/Specialty  Pending psychiatry    Disposition  Pending psychiatry        Review of Systems   Constitutional: Negative for chills, fever and malaise/fatigue.   HENT: Negative for hearing loss, sinus pain and sore throat.    Eyes: Positive for redness. Negative for discharge.   Respiratory: Negative for cough, sputum production and shortness of breath.    Cardiovascular: Negative for chest pain and palpitations.   Gastrointestinal: Negative for abdominal pain, constipation, diarrhea, nausea and vomiting.   Genitourinary: Negative for dysuria.   Musculoskeletal: Negative for falls.   Skin: Negative for itching and rash.   Neurological: Negative for dizziness, weakness and headaches.   Psychiatric/Behavioral: Positive for substance abuse (Mild alcohol daily use). Negative for depression and suicidal ideas. The patient is not nervous/anxious.       Physical Exam  Laboratory/Imaging   Hemodynamics  Temp (24hrs), Av.1 °C (98.7 °F), Min:36.9 °C (98.4 °F), Max:37.3 °C (99.2 °F)   Temperature: 36.9 °C (98.5 °F)  Pulse  Av.7  Min: 69  Max: 111 Heart Rate  (Monitored): 85  Blood Pressure: 116/70, NIBP: 118/77      Respiratory      Respiration: 16, Pulse Oximetry: 93 %, O2 Daily Delivery Respiratory : Room Air with O2 Available     Work Of Breathing / Effort: Mild  RUL Breath Sounds: Clear, RML Breath Sounds: Clear, RLL Breath Sounds: Clear;Diminished, MARY Breath Sounds: Clear, LLL Breath Sounds: Clear;Diminished    Fluids    Intake/Output Summary (Last 24 hours) at 09/12/18 2027  Last data filed at 09/12/18 1900   Gross per 24 hour   Intake             4222 ml   Output             1310 ml   Net             2912 ml       Nutrition  Orders Placed This Encounter   Procedures   • Diet Order Regular     Standing Status:   Standing     Number of Occurrences:   1     Order Specific Question:   Diet:     Answer:   Regular [1]     Physical Exam   Constitutional: She is oriented to person, place, and time. She appears well-developed and well-nourished. No distress.   Minimally disheveled   HENT:   Head: Normocephalic and atraumatic.   Right Ear: External ear normal.   Left Ear: External ear normal.   Nose: Nose normal.   Mouth/Throat: Oropharynx is clear and moist. No oropharyngeal exudate.   Eyes: Pupils are equal, round, and reactive to light. EOM are normal. Right eye exhibits no discharge. Left eye exhibits no discharge. No scleral icterus.   Bilateral scleral and conjunctival injection   Neck: Neck supple.   Cardiovascular: Normal rate and regular rhythm.    Pulmonary/Chest: Effort normal and breath sounds normal.   Abdominal: Soft. Bowel sounds are normal. She exhibits no distension. There is no tenderness.   Musculoskeletal: She exhibits no edema or tenderness.   Neurological: She is alert and oriented to person, place, and time. She has normal reflexes.   No tremor   Skin: Skin is warm and dry. She is not diaphoretic.   Psychiatric: She has a normal mood and affect.   Nursing note and vitals reviewed.      Recent Labs      09/11/18   1728  09/12/18   0330   WBC  8.1   7.2   RBC  4.40  3.88*   HEMOGLOBIN  14.1  12.4   HEMATOCRIT  41.3  37.1   MCV  93.9  95.6   MCH  32.0  32.0   MCHC  34.1  33.4*   RDW  43.3  44.2   PLATELETCT  237  237   MPV  9.2  9.6     Recent Labs      09/11/18   1728  09/12/18   0330   SODIUM  141  140  139   POTASSIUM  3.7  3.6  3.6   CHLORIDE  111  110  111   CO2  22  19*  21   GLUCOSE  106*  104*  80   BUN  10  13  12   CREATININE  0.78  0.73  0.67   CALCIUM  8.4  8.3*  7.4*                      Assessment/Plan     Acute alcoholic intoxication with delirium (HCC)   Assessment & Plan    Patient drank a lot prior to admission but normally only drinks 2 glasses of wine per day  She says she used to drink a lot more but then after she started medications it improved        Hypoxemia   Assessment & Plan    Resolved        Overdose   Assessment & Plan    Intentional, unknown quantities of ativan and alcohol.  BAL On admit 0.28  Patient's toxicology screen was negative for Ativan for unclear reasons  Patient did this impulsively after losing a bunch of money gambling  She denies ongoing depression or suicidal thoughts  Okay to downgrade to medical floor, q. 30 minute suicide checks  Psychiatry telemedicine consultation tomorrow-patient needs their clearance before can be discharged          Quality-Core Measures   Reviewed items::  Labs reviewed, Medications reviewed and Radiology images reviewed  Perez catheter::  No Perez  DVT prophylaxis pharmacological::  Enoxaparin (Lovenox)  Ulcer Prophylaxis::  Not indicated  Assessed for rehabilitation services:  Patient returned to prior level of function, rehabilitation not indicated at this time

## 2018-09-13 NOTE — PROGRESS NOTES
Patient is awake in bed with television on and lab at bedside. Medicated per MAR. Patient declines any needs at this time, safety precautions in place. Will continue to monitor.

## 2018-09-13 NOTE — PROGRESS NOTES
1915 Bedside report received by NANNETTE North. Patient is awake, plan of care discussed, patient declines any needs at this time.     2015 Assessment completed. Patient c/o discomfort on her left arm, ice pack given. New IV bag hung.

## 2018-09-13 NOTE — PROGRESS NOTES
Bedside report received from Estefania RN. Pt resting in bed, RR even and unlabored.  Safety precautions in place, call light within reach.

## 2018-09-14 NOTE — DISCHARGE SUMMARY
Discharge Summary    CHIEF COMPLAINT ON ADMISSION  Chief Complaint   Patient presents with   • Suicidal   • Drug Overdose       Reason for Admission  ems     Admission Date  9/11/2018    CODE STATUS  Prior    HPI & HOSPITAL COURSE  This is a 42 y.o. female here with Ms. Walker was here with a suspected overdose of T42 - Antiepileptic, sedative-hypnotic, or anti-Parkinsonism drugs; she has other known overdoses: Unknown substance or drug. Alcohol.    The patient does have a history of depression, but her mood had been relatively stable she has a history of compulsive gambling and apparently over the last few days has lost quite a bit of money to the slot machines, when she realized her situation she became despondent and drank a large amount of alcohol and then reportedly took several Ativan pills as well.  Despite presenting about 4 hours afterward there were no detectable benzodiazepines in her blood but her blood alcohol level was 0.28 by the time of her arrival here she claims she was no longer suicidal. ,  Due to her overdose, resulting in some hypoxemia she was placed in the ICU for close monitoring.  Critical care was consulted and felt the patient was stable for transfer to the floor the following day, she underwent a telemedicine consult with psychiatry who felt she could be safely released to home.  The patient declined change in her medications she said this was a wakeup call for her regarding her Gambling issues.  She had stopped before but always seems to succumb to reasons to be in the casino, she will avoid this in the future.       Therefore, she is discharged in good and stable condition to home with close outpatient follow-up.    The patient met 2-midnight criteria for an inpatient stay at the time of discharge.    Discharge Date  9/13/2018    FOLLOW UP ITEMS POST DISCHARGE  PCP    DISCHARGE DIAGNOSES  Active Problems:    Overdose POA: Unknown    Hypoxemia POA: Unknown    Acute alcoholic  "intoxication with delirium (HCC) POA: Unknown  Resolved Problems:    * No resolved hospital problems. *      FOLLOW UP  Future Appointments  Date Time Provider Department Center   9/19/2018 11:20 AM CHERIE Verdugo [facility] JAILENE Rodriguez     CHERIE Verdugo  [address]  [place] [id number]  [phone]            MEDICATIONS ON DISCHARGE     Medication List      CONTINUE taking these medications      Instructions   FLUoxetine 20 MG Caps  Commonly known as:  PROZAC   Take 40 mg by mouth every day.  Dose:  40 mg     therapeutic multivitamin-minerals Tabs   Take 1 Tab by mouth every day.  Dose:  1 Tab     Vitamin D 2000 units Caps   Take 1 Cap by mouth every day.  Dose:  2000 Units            Allergies  Allergies   Allergen Reactions   • Sudafed Unspecified     \"reverse effect\" makes mucous thicker   RXN=15 years ago stopped taking       DIET  Regular    ACTIVITY  Regular, return to work note was provided    CONSULTATIONS  Psychiatry-Dr. Carol Ann Cardoza  Critical care- Dr James Montalvo    PROCEDURES  none    LABORATORY  Lab Results   Component Value Date    SODIUM 139 09/12/2018    POTASSIUM 3.6 09/12/2018    CHLORIDE 111 09/12/2018    CO2 21 09/12/2018    GLUCOSE 80 09/12/2018    BUN 12 09/12/2018    CREATININE 0.67 09/12/2018        Lab Results   Component Value Date    WBC 7.2 09/12/2018    HEMOGLOBIN 12.4 09/12/2018    HEMATOCRIT 37.1 09/12/2018    PLATELETCT 237 09/12/2018        Total time of the discharge process exceeds 32 minutes.  "

## 2018-12-28 LAB — EKG IMPRESSION: NORMAL

## 2019-06-19 ENCOUNTER — HOSPITAL ENCOUNTER (EMERGENCY)
Facility: MEDICAL CENTER | Age: 44
End: 2019-06-19
Attending: EMERGENCY MEDICINE
Payer: COMMERCIAL

## 2019-06-19 VITALS
TEMPERATURE: 98.4 F | SYSTOLIC BLOOD PRESSURE: 157 MMHG | RESPIRATION RATE: 20 BRPM | DIASTOLIC BLOOD PRESSURE: 99 MMHG | BODY MASS INDEX: 33.52 KG/M2 | HEART RATE: 89 BPM | OXYGEN SATURATION: 95 % | WEIGHT: 208.56 LBS | HEIGHT: 66 IN

## 2019-06-19 DIAGNOSIS — T50.904A DRUG OVERDOSE, UNDETERMINED INTENT, INITIAL ENCOUNTER: ICD-10-CM

## 2019-06-19 DIAGNOSIS — F10.10 ALCOHOL ABUSE: ICD-10-CM

## 2019-06-19 LAB
ALBUMIN SERPL BCP-MCNC: 3.6 G/DL (ref 3.2–4.9)
ALBUMIN/GLOB SERPL: 1.2 G/DL
ALP SERPL-CCNC: 53 U/L (ref 30–99)
ALT SERPL-CCNC: 21 U/L (ref 2–50)
AMPHETAMINES UR QL SCN: NEGATIVE
ANION GAP SERPL CALC-SCNC: 13 MMOL/L (ref 0–11.9)
AST SERPL-CCNC: 20 U/L (ref 12–45)
BARBITURATES UR QL SCN: NEGATIVE
BASOPHILS # BLD AUTO: 1 % (ref 0–1.8)
BASOPHILS # BLD: 0.05 K/UL (ref 0–0.12)
BENZODIAZ UR QL SCN: NEGATIVE
BILIRUB SERPL-MCNC: 0.3 MG/DL (ref 0.1–1.5)
BUN SERPL-MCNC: 16 MG/DL (ref 8–22)
CALCIUM SERPL-MCNC: 8.1 MG/DL (ref 8.4–10.2)
CHLORIDE SERPL-SCNC: 106 MMOL/L (ref 96–112)
CO2 SERPL-SCNC: 22 MMOL/L (ref 20–33)
COCAINE UR QL SCN: NEGATIVE
CREAT SERPL-MCNC: 0.7 MG/DL (ref 0.5–1.4)
EKG IMPRESSION: NORMAL
EOSINOPHIL # BLD AUTO: 0.21 K/UL (ref 0–0.51)
EOSINOPHIL NFR BLD: 4.2 % (ref 0–6.9)
ERYTHROCYTE [DISTWIDTH] IN BLOOD BY AUTOMATED COUNT: 42.9 FL (ref 35.9–50)
ETHANOL BLD-MCNC: 0.08 G/DL
GLOBULIN SER CALC-MCNC: 2.9 G/DL (ref 1.9–3.5)
GLUCOSE SERPL-MCNC: 112 MG/DL (ref 65–99)
HCT VFR BLD AUTO: 41.8 % (ref 37–47)
HGB BLD-MCNC: 14 G/DL (ref 12–16)
IMM GRANULOCYTES # BLD AUTO: 0.01 K/UL (ref 0–0.11)
IMM GRANULOCYTES NFR BLD AUTO: 0.2 % (ref 0–0.9)
LYMPHOCYTES # BLD AUTO: 1.52 K/UL (ref 1–4.8)
LYMPHOCYTES NFR BLD: 30.4 % (ref 22–41)
MCH RBC QN AUTO: 31.3 PG (ref 27–33)
MCHC RBC AUTO-ENTMCNC: 33.5 G/DL (ref 33.6–35)
MCV RBC AUTO: 93.3 FL (ref 81.4–97.8)
MONOCYTES # BLD AUTO: 0.61 K/UL (ref 0–0.85)
MONOCYTES NFR BLD AUTO: 12.2 % (ref 0–13.4)
NEUTROPHILS # BLD AUTO: 2.6 K/UL (ref 2–7.15)
NEUTROPHILS NFR BLD: 52 % (ref 44–72)
NRBC # BLD AUTO: 0 K/UL
NRBC BLD-RTO: 0 /100 WBC
OPIATES UR QL SCN: NEGATIVE
PCP UR QL SCN: NEGATIVE
PLATELET # BLD AUTO: 261 K/UL (ref 164–446)
PMV BLD AUTO: 8.8 FL (ref 9–12.9)
POC BREATHALIZER: 0.07 PERCENT (ref 0–0.01)
POTASSIUM SERPL-SCNC: 4 MMOL/L (ref 3.6–5.5)
PROT SERPL-MCNC: 6.5 G/DL (ref 6–8.2)
RBC # BLD AUTO: 4.48 M/UL (ref 4.2–5.4)
SODIUM SERPL-SCNC: 141 MMOL/L (ref 135–145)
THC UR QL SCN: NEGATIVE
WBC # BLD AUTO: 5 K/UL (ref 4.8–10.8)

## 2019-06-19 PROCEDURE — 99285 EMERGENCY DEPT VISIT HI MDM: CPT

## 2019-06-19 PROCEDURE — 80305 DRUG TEST PRSMV DIR OPT OBS: CPT

## 2019-06-19 PROCEDURE — 36415 COLL VENOUS BLD VENIPUNCTURE: CPT

## 2019-06-19 PROCEDURE — 302970 POC BREATHALIZER: Performed by: EMERGENCY MEDICINE

## 2019-06-19 PROCEDURE — 99284 EMERGENCY DEPT VISIT MOD MDM: CPT | Performed by: PSYCHIATRY & NEUROLOGY

## 2019-06-19 PROCEDURE — 80307 DRUG TEST PRSMV CHEM ANLYZR: CPT

## 2019-06-19 PROCEDURE — 80053 COMPREHEN METABOLIC PANEL: CPT

## 2019-06-19 PROCEDURE — 93005 ELECTROCARDIOGRAM TRACING: CPT | Performed by: EMERGENCY MEDICINE

## 2019-06-19 PROCEDURE — 85025 COMPLETE CBC W/AUTO DIFF WBC: CPT

## 2019-06-19 RX ORDER — NALTREXONE HYDROCHLORIDE 50 MG/1
50 TABLET, FILM COATED ORAL DAILY
Qty: 30 TAB | Refills: 0 | Status: SHIPPED | OUTPATIENT
Start: 2019-06-19 | End: 2021-07-02
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
## 2019-06-19 NOTE — DISCHARGE INSTRUCTIONS
After speaking with psychiatry here, we would like to help to get enrolled in an outpatient treatment program.  Please use the phone number above to call tomorrow to confirm your referral and arrange an initial evaluation.  Please use the medication that we have prescribed, which was recommended for you by psychiatry.
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
## 2019-06-19 NOTE — ED NOTES
Medication Reconciliation has been completed by interviewing patient with consent to do so with family/visitors in the room.    Allergies were reviewed and updated    No oral antibiotics within the past 14 days    Pt home pharmacy:[facility]    Pt reports that she took a handful of PROZAC this morning

## 2019-06-19 NOTE — PSYCHIATRY
BRIEF PSYCHIATRIC CONSULT NOTE: patient seen, full note to follow.    Recommend SW set up and IOP program program for her, which might be more appropriate than an inpatient program.   Recommend continuing prozac once cleared medically.   Recommend starting naltrexone for gambling and alcohol cravings.       -Assessment:  PTSD  Alcohol use disorder   Adjustment disorder with depressed mood     -Plan:continue to follow     Thank you for the consult.

## 2019-06-19 NOTE — DISCHARGE PLANNING
"Call from Dr. Hansen @ [facility] ER and he asks for assistance to place pt in IOP.  Discussed with JYOTI Clayton, called IOP [facility] @ [phone], spoke to \"Simran\" who advised Alert Team/provider make \"referral\", and that their computers were down and they could likely  pt tomorrow am.  Paco RODRIGUEZ, called Dr. Gokul SIMEON.   "
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
## 2019-06-19 NOTE — ED NOTES
"Patient brought to ED room 2, ERP and primary RN at bedside. [place] assessment started by triage RN, question 6 of [place] assessment completed by Primary RN. Patient states \"no\" to question 6 and denies any reason for taking pills last night. To maintain patient safety, Room safety checklist completed by primary RN and ED tech. All potentially harmful objects removed from patient's room, pt placed in hospital gown. Charge RN informed.   "

## 2019-06-19 NOTE — ED NOTES
Discharge instructions provided.  Pt verbalized the understanding of discharge instructions to follow up with psychiatry, PCP and to return to ER if condition worsens.  Pt ambulated out of ER without difficulty.

## 2019-06-19 NOTE — DISCHARGE PLANNING
JYOTI met with patient bedside and provide her with substance abuse and counseling resource lists.  Patient called [facility] and scheduled an appointment for 6/25/19 at 9 am.  Patient also called her psychiatrist Dr. Deras and scheduled an appointment for Monday 6/2/19 at 2:30 pm. Dr. Jasso and Dr. Hansen updated.

## 2020-07-09 NOTE — CARE PLAN
"Nutrition Assessment  Client name:  Dong Denny   (Annual  physical)  :  1988  Age:  31 y.o.  Gender:  male    Client states:  He has history of kidney stones of which he has had 2 incidents, the last one 5 years ago. This is a hereditary issue related to calcium accumulation. He attributes one incident to the consumption of protein shakes 3-4 daily when he was trying to gain wt. Currently he has no refrigeration, awaiting delivery from [employer] and therefore has been dining out 2 meals for the past 2 weeks which may be Cane[employer], or Avinash's. His typical meals are Healthy choice power bowls for lunch and 4-6 oz. Lean protein at dinner. Interested in his body fat composition, he has a Dexa scan yearly and decided to engage in a personal experiment. He went on the Keto plan and although he lost 20# the scan revealed that he had gained 1% of body fat. He assessed that most of the wt that he lost was water and not fat. He intermittently uses My Fitness Pal to track his intake and has been doing so for the past 3 years, although not currently and follows 1500 calories and has lost 3# since January. Today he body fat % is 18% however his goal is to reduce it to 15% as he runs better, moves more quickly for beach volleyball and can jump higher.     Anthropometrics  Height: 5'9"      Weight:  154#  BMI:  22.74  % Body Fat:  18.4    Clinical Signs/Symptoms  N/V/D:  none  Appetite (Good, Fair, or Poor):  good      Past Medical History:   Diagnosis Date    Allergic rhinitis     Back pain with sciatica     Related to strength training in past. Resolved.    Kidney stones        Past Surgical History:   Procedure Laterality Date    TONSILLECTOMY      TURBINATE RESECTION Bilateral , 2006    done twice    TYMPANOSTOMY TUBE PLACEMENT Bilateral        Medications    has a current medication list which includes the following prescription(s): fluticasone propionate and " Problem: Health Behavior:  Goal: Ability to identify and utilize available support systems will improve    Intervention: Collaborate with /case management, patient, significant other/support system, and Interdisciplinary Team  Consult for  for support systems         montelukast.    Vitamins, Minerals, and/or Supplements:  One a day, fish oil - not in last 2 weeks, glucosamine chrondroitin     Food/Medication Interactions:  Reviewed     Food Allergies or Intolerances:  none     Social History    Marital status:  Single  Employment:   One a day, fish oil - not in last 2 weeks, glucosamine chrondroitin     Social History     Tobacco Use    Smoking status: Never Smoker    Smokeless tobacco: Never Used   Substance Use Topics    Alcohol use: Yes     Comment: socially - 3 beers per week        Lab Reports   Total Cholesterol:  153    Triglycerides:  136  HDL:  36  LDL:  89.8   Glucose:  104  HbA1c:  5.0  BP:  108/72     Food History  Breakfast: 2 c.coffee, creamer, splenda or sugar    Mid-morning Snack:  none  Lunch:  Pb and banana sandwich on white bread or lasagna, water (with refrigerator - Healthy Choice power bowl)  Mid-afternoon Snack:  none  Dinner:  Pepperoni and sausage pizza, (with refrigerator - 4 -6 oz. Of chicken, beef or tofu + vegetable)  H.S. Snack:  none  *Fluid intake:  Coffee, water, ETOH  Cooks with olive oil    Exercise History:  Runs 3x/wk 2 - 20 minute and 1 40 minute session, resistance training 3x/wk for 30 - 45 minutes    Cultural/Spiritual/Personal Preferences:  None identified    Support System:  Girlfriend, friends    State of Change:  Preparation     Barriers to Change:  None identified    Diagnosis    No nutritional diagnosis at this time.    Intervention    RMR (Method:  In Body):  2080 kcal  Activity Factor:  1.3  MARINO:  2080    Goals:  1.  Increase whole grains by 50% - trial of multigrain  2.  Consider consuming breakfast - provided easy options  3.  Decrease Triglyceride below 136, increase HDL:>36  4.  Healthier choices when dining out 50% of time  5.  Decrease body fat to 15%    Nutrition Education  Reviewed and explained laboratory results and complimented client on healthy values and noted that Triglyceride result although in acceptable range  had significantly increased from last year, possibly due to higher consumption of white carb's. Client shares that he does not like wheat, therefore encouraged multi grains. Provided easy suggestions for breakfast and benefits it yields. Explained foods which increase HDL and reduce Triglyceride. Provided brand names of greek yogurt and protein bars. Reviewed the Fast Food guide  And Lite Dining Guides and gave suggestions for pizza, smoothies, Avinash's and [employer].    Patient verbalized understanding of nutrition education and recommendations received.    Handouts Provided  Meal Planning Guide  Restaurant Guide  Eat Fit Shopping List  Eat Fit Natalie  Fast Food Guide  Vitamin/Mineral Guide    Monitoring/Evaluation    Monitor the following:  Weight  BMI  % Body Fat  Caloric intake  Labs: Lipids/HAIC    Follow Up Plan:  Communication with referring healthcare provider is unnecessary at this time as patient presented as part of annual wellness exam.  However, will follow up with patient in 1-2 years.

## 2020-12-31 ENCOUNTER — OFFICE VISIT (OUTPATIENT)
Dept: URGENT CARE | Facility: CLINIC | Age: 45
End: 2020-12-31
Payer: OTHER GOVERNMENT

## 2020-12-31 ENCOUNTER — HOSPITAL ENCOUNTER (OUTPATIENT)
Facility: MEDICAL CENTER | Age: 45
End: 2020-12-31
Attending: PHYSICIAN ASSISTANT
Payer: OTHER GOVERNMENT

## 2020-12-31 ENCOUNTER — APPOINTMENT (OUTPATIENT)
Dept: RADIOLOGY | Facility: IMAGING CENTER | Age: 45
End: 2020-12-31
Attending: PHYSICIAN ASSISTANT
Payer: OTHER GOVERNMENT

## 2020-12-31 VITALS
SYSTOLIC BLOOD PRESSURE: 136 MMHG | WEIGHT: 226 LBS | TEMPERATURE: 98.3 F | RESPIRATION RATE: 16 BRPM | DIASTOLIC BLOOD PRESSURE: 88 MMHG | HEART RATE: 80 BPM | OXYGEN SATURATION: 95 % | BODY MASS INDEX: 36.32 KG/M2 | HEIGHT: 66 IN

## 2020-12-31 DIAGNOSIS — J98.8 VIRAL RESPIRATORY ILLNESS: ICD-10-CM

## 2020-12-31 DIAGNOSIS — B97.89 VIRAL RESPIRATORY ILLNESS: ICD-10-CM

## 2020-12-31 DIAGNOSIS — Z20.822 SUSPECTED COVID-19 VIRUS INFECTION: ICD-10-CM

## 2020-12-31 DIAGNOSIS — J98.01 BRONCHOSPASM: ICD-10-CM

## 2020-12-31 DIAGNOSIS — R05.9 COUGH: ICD-10-CM

## 2020-12-31 PROCEDURE — 99214 OFFICE O/P EST MOD 30 MIN: CPT | Mod: CS | Performed by: PHYSICIAN ASSISTANT

## 2020-12-31 PROCEDURE — 71046 X-RAY EXAM CHEST 2 VIEWS: CPT | Mod: TC,FY | Performed by: PHYSICIAN ASSISTANT

## 2020-12-31 PROCEDURE — U0003 INFECTIOUS AGENT DETECTION BY NUCLEIC ACID (DNA OR RNA); SEVERE ACUTE RESPIRATORY SYNDROME CORONAVIRUS 2 (SARS-COV-2) (CORONAVIRUS DISEASE [COVID-19]), AMPLIFIED PROBE TECHNIQUE, MAKING USE OF HIGH THROUGHPUT TECHNOLOGIES AS DESCRIBED BY CMS-2020-01-R: HCPCS

## 2020-12-31 RX ORDER — INHALER,ASSIST DEVICE,MED MASK
1 SPACER (EA) MISCELLANEOUS ONCE
Status: COMPLETED | OUTPATIENT
Start: 2020-12-31 | End: 2020-12-31

## 2020-12-31 RX ORDER — ALBUTEROL SULFATE 90 UG/1
2 AEROSOL, METERED RESPIRATORY (INHALATION) EVERY 6 HOURS PRN
Qty: 8.5 G | Refills: 0 | Status: SHIPPED | OUTPATIENT
Start: 2020-12-31 | End: 2021-07-02

## 2020-12-31 RX ORDER — DEXTROMETHORPHAN HYDROBROMIDE AND PROMETHAZINE HYDROCHLORIDE 15; 6.25 MG/5ML; MG/5ML
5 SYRUP ORAL 4 TIMES DAILY PRN
Qty: 100 ML | Refills: 0 | Status: SHIPPED | OUTPATIENT
Start: 2020-12-31 | End: 2021-01-05

## 2020-12-31 RX ORDER — ALBUTEROL SULFATE 90 UG/1
4 AEROSOL, METERED RESPIRATORY (INHALATION) ONCE
Status: COMPLETED | OUTPATIENT
Start: 2020-12-31 | End: 2020-12-31

## 2020-12-31 RX ORDER — BENZONATATE 100 MG/1
200 CAPSULE ORAL 3 TIMES DAILY PRN
Qty: 60 CAP | Refills: 0 | Status: SHIPPED | OUTPATIENT
Start: 2020-12-31 | End: 2021-07-02

## 2020-12-31 RX ADMIN — ALBUTEROL SULFATE 4 PUFF: 90 AEROSOL, METERED RESPIRATORY (INHALATION) at 11:19

## 2020-12-31 RX ADMIN — Medication 1 EACH: at 11:20

## 2020-12-31 ASSESSMENT — ENCOUNTER SYMPTOMS
MYALGIAS: 1
COUGH: 1
SHORTNESS OF BREATH: 1
FEVER: 0
HEADACHES: 1
CHILLS: 0

## 2020-12-31 ASSESSMENT — FIBROSIS 4 INDEX: FIB4 SCORE: 0.75

## 2020-12-31 NOTE — PATIENT INSTRUCTIONS
INSTRUCTIONS FOR COVID-19 OR ANY OTHER INFECTIOUS RESPIRATORY ILLNESSES    The Centers for Disease Control and Prevention (CDC) states that early indications for COVID-19 include cough, shortness of breath, difficulty breathing, or at least two of the following symptoms: chills, shaking with chills, muscle pain, headache, sore throat, and loss of taste or smell. Symptoms can range from mild to severe and may appear up to two weeks after exposure to the virus.    The practice of self-isolation and quarantine helps protect the public and your family by  preventing exposure to people who have or may have a contagious disease. Please follow the prevention steps below as based on CDC guidelines:    WHEN TO STOP ISOLATION: Persons with COVID-19 or any other infectious respiratory illness who have symptoms and were advised to care for themselves at home may discontinue home isolation under the following conditions:  · At least 24 hours have passed since recovery defined as resolution of fever without the use of fever-reducing medications; AND,  · Improvement in respiratory symptoms (e.g., cough, shortness of breath); AND,  · At least 10 days have passed since symptoms first appeared and have had no subsequent illness.    MONITOR YOUR SYMPTOMS: If your illness is worsening, seek prompt medical attention. If you have a medical emergency and need to call 911, notify the dispatch personnel that you have, or are being evaluated for confirmed or suspected COVID-19 or another infectious respiratory illness. Wear a facemask if possible.    ACTIVITY RESTRICTION: restrict activities outside your home, except for getting medical care. Do not go to work, school, or public areas. Avoid using public transportation, ride-sharing, or taxis.    SCHEDULED MEDICAL APPOINTMENTS: Notify your provider that you have, or are being evaluated for, confirmed or suspected COVID-19 or another infectious respiratory. This will help the healthcare  provider’s office safely take care of you and keep other people from getting exposed or infected.    FACEMASKS, when to wear: Anytime you are away from your home or around other people or pets. If you are unable to wear one, maintain a minimum of 6 feet distancing from others.    LIVING ENVIRONMENT: Stay in a separate room from other people and pets. If possible, use a separate bathroom, have someone else care for your pets and avoid sharing household items. Any items used should be washed thoroughly with soap and water. Clean all “high-touch” surfaces every day. Use a household cleaning spray or wipe, according to the label instructions. High touch surfaces include (but are not limited to) counters, tabletops, doorknobs, bathroom fixtures, toilets, phones, keyboards, tablets, and bedside tables.     HAND WASHING: Frequently wash hands with soap and water for at least 20 seconds,  especially after blowing your nose, coughing, or sneezing; going to the bathroom; before and after interacting with pets; and before and after eating or preparing food. If hands are visibly dirty use soap and water. If soap and water are not available, use an alcohol-based hand  with at least 60% alcohol. Avoid touching your eyes, nose, and mouth with unwashed hands. Cover your coughs and sneezes with a tissue. Throw used tissues in a lined trash can. Immediately wash your hands.    ACTIVE/FACILITATED SELF-MONITORING: Follow instructions provided by your local health department or health professionals, as appropriate. When working with your local health department check their available hours.    [facility]   Phone Number   [facility] [phone]   [facility] [place] [phone]   [place] [address]   [place] [phone]     IF YOU HAVE CONFIRMED POSITIVE COVID-19:    Those who have completely recovered from COVID-19 may have immune-boosting antibodies in their plasma--called “convalescent plasma”--that could be  used to treat critically ill COVID19 patients.    [facility] is excited to begin working with [facility] on collecting convalescent plasma from  people who have recovered from COVID-19 as part of a program to treat patients infected with the virus. This FDA-approved “emergency investigational new drug” is a special blood product containing antibodies that may give patients an extra boost to fight the virus.    To be eligible to donate convalescent plasma, you must have a prior COVID-19 diagnosis documented by a laboratory test (or a positive test result for SARS-CoV-2 antibodies) and meet additional eligibility requirements.    If you are interested in donating convalescent plasma or have any additional questions, please contact the [facility] Convalescent Plasma  at [phone] or via e-mail at [email or web address].

## 2020-12-31 NOTE — LETTER
December 31, 2020         Patient: Shawna Walker   YOB: 1975   Date of Visit: 12/31/2020    Your employee was seen in our clinic today.  A concern for COVID-19 has been identified and testing is in progress. We are asking you to excuse absences while following self-isolation protocol per Center for Disease Control (CDC) guidelines.  Your employee will be able to access test results through our electronic delivery system called [employer].     If the results of testing are positive, your employee should follow the CDC guidelines.  These are isolation for a minimum of 10 days and at least 24 hours have passed since your last fever without the use of fever-reducing medications and all other symptoms have improved.  The health department may contact you and provide further directions regarding self-isolation and return to work.     Negative result without close contact*:  If your employee was tested due to their symptoms without close contact to someone with COVID-19 and their test is negative: your employee should not return to work or regular activities until 24 hours after symptoms fully improve. (For example, if patient feels back to normal on Tuesday, should remain isolated through Wednesday).      Negative with close contact*:  If your employee was tested due to close contact to someone, they should self isolate for 14 days after their exposure.    Negative with positive household member  If your employee was due to a positive household member they should still quarantine for a period of 14 days.  The 14 day period begins once the household member is isolated. If unable to quarantine (for example mom from infant), the CDC advises an additional 14-day quarantine period from the COVID-19 household member.   In general, repeat testing is not necessary and not offered through our [facility].     This is the only note that will be provided from [facility] for this visit.  Your employee will  require an appointment with a primary care provider if FMLA or disability forms are required.  If you have any questions please do not hesitate to call me at the phone number listed below.    Sincerely,    JANE Page.-C.  [phone]

## 2020-12-31 NOTE — PROGRESS NOTES
"Subjective:   Shawna Walker  is a 45 y.o. female who presents for Body Aches (x 3 days, congestion)    This is a new problem.  Patient presents to urgent care with 3-day history of congestion, cough, shortness of breath, chest tightness, sensation of wheezing as well as generalized body aches and fatigue.  Patient denies any fever or chills, loss of taste.  She does believe she has lost her sense of smell.  She has had no nausea, vomiting or diarrhea.  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    Patient has no prior history of asthma.  She has wheezed in the past with illnesses and has utilized albuterol occasionally.  Patient is a non-smoker.      HPI  Review of Systems   Constitutional: Positive for malaise/fatigue. Negative for chills and fever.   HENT: Positive for congestion.    Respiratory: Positive for cough and shortness of breath.    Cardiovascular: Negative for chest pain.   Musculoskeletal: Positive for myalgias.   Neurological: Positive for headaches.   All other systems reviewed and are negative.    Allergies   Allergen Reactions   • Sudafed Unspecified     \"reverse effect\" makes mucous thicker   RXN=15 years ago stopped taking     Reviewed past medical, surgical , social and family history.  Reviewed prescription and over-the-counter medications with patient and electronic health record today.     Objective:   /88 (BP Location: Right arm, Patient Position: Sitting, BP Cuff Size: Adult)   Pulse 80   Temp 36.8 °C (98.3 °F) (Temporal)   Resp 16   Ht 1.676 m (5' 6\")   Wt 102.5 kg (226 lb)   LMP 07/31/2016   SpO2 95%   BMI 36.48 kg/m²   Physical Exam  Vitals signs reviewed.   Constitutional:       General: She is not in acute distress.     Appearance: She is well-developed. She is not ill-appearing or toxic-appearing.   HENT:      Head: Normocephalic and atraumatic.      Right Ear: Tympanic " membrane, ear canal and external ear normal.      Left Ear: Tympanic membrane, ear canal and external ear normal.      Nose: Nose normal.      Mouth/Throat:      Lips: Pink. No lesions.      Mouth: Mucous membranes are moist.      Pharynx: Oropharynx is clear. Uvula midline. No oropharyngeal exudate.   Eyes:      General: Lids are normal.      Extraocular Movements: Extraocular movements intact.      Conjunctiva/sclera: Conjunctivae normal.      Pupils: Pupils are equal, round, and reactive to light.   Neck:      Musculoskeletal: Normal range of motion and neck supple.   Cardiovascular:      Rate and Rhythm: Normal rate and regular rhythm.      Heart sounds: Normal heart sounds. No murmur. No friction rub. No gallop.    Pulmonary:      Effort: Pulmonary effort is normal. Prolonged expiration present. No respiratory distress.      Breath sounds: Decreased air movement present. Examination of the right-upper field reveals wheezing. Examination of the left-upper field reveals wheezing. Examination of the right-middle field reveals wheezing. Examination of the left-middle field reveals wheezing. Examination of the right-lower field reveals wheezing and rhonchi. Examination of the left-lower field reveals wheezing and rhonchi. Wheezing and rhonchi present.   Abdominal:      General: Bowel sounds are normal. There is no distension.      Palpations: Abdomen is soft. There is no mass.      Tenderness: There is no abdominal tenderness. There is no guarding or rebound.   Musculoskeletal: Normal range of motion.         General: No tenderness or deformity.   Lymphadenopathy:      Head:      Right side of head: No submental, submandibular or tonsillar adenopathy.      Left side of head: No submental, submandibular or tonsillar adenopathy.      Cervical: No cervical adenopathy.      Upper Body:      Right upper body: No supraclavicular adenopathy.      Left upper body: No supraclavicular adenopathy.   Skin:     General: Skin is  warm and dry.      Findings: No rash.   Neurological:      Mental Status: She is alert and oriented to person, place, and time.      Cranial Nerves: Cranial nerves are intact. No cranial nerve deficit.      Sensory: Sensation is intact. No sensory deficit.      Motor: Motor function is intact.      Coordination: Coordination is intact. Coordination normal.      Gait: Gait is intact.   Psychiatric:         Attention and Perception: Attention normal.         Mood and Affect: Mood and affect normal.         Speech: Speech normal.         Behavior: Behavior normal. Behavior is cooperative.         Thought Content: Thought content normal.         Judgment: Judgment normal.           Assessment/Plan:   1. Viral respiratory illness  - COVID/SARS CoV-2 PCR; Future  - benzonatate (TESSALON) 100 MG Cap; Take 2 Caps by mouth 3 times a day as needed.  Dispense: 60 Cap; Refill: 0  - albuterol inhaler 4 Puff  - AEROCHAMBER PLUS-MEDIUM MASK MISC 1 Each  - DX-CHEST-2 VIEWS; Future    2. Suspected COVID-19 virus infection  - COVID/SARS CoV-2 PCR; Future  - albuterol inhaler 4 Puff  - AEROCHAMBER PLUS-MEDIUM MASK MISC 1 Each  - DX-CHEST-2 VIEWS; Future    3. Bronchospasm  - albuterol inhaler 4 Puff  - AEROCHAMBER PLUS-MEDIUM MASK MISC 1 Each  - DX-CHEST-2 VIEWS; Future  - albuterol 108 (90 Base) MCG/ACT Aero Soln inhalation aerosol; Inhale 2 Puffs every 6 hours as needed for Shortness of Breath.  Dispense: 8.5 g; Refill: 0    4. Cough  - promethazine-dextromethorphan (PROMETHAZINE-DM) 6.25-15 MG/5ML syrup; Take 5 mL by mouth 4 times a day as needed for Cough for up to 5 days.  Dispense: 100 mL; Refill: 0    Chest x-ray is obtained, read by radiologist and reviewed by myself with findings as follows:      RADIOLOGY RESULTS   Dx-chest-2 Views    Result Date: 12/31/2020 12/31/2020 10:52 AM HISTORY/REASON FOR EXAM:  Cough TECHNIQUE/EXAM DESCRIPTION:  PA and lateral views of the chest. COMPARISON:  9/11/2018 FINDINGS: The heart is not  enlarged. No focal consolidation, pleural effusion or pneumothorax is identified.  Costophrenic angles are clear. Mild degenerative changes are seen in the spine.     No acute cardiopulmonary process is identified.           Patient is given albuterol 4 inhalations here in clinic with AeroChamber with significant improvement.    Patient is given a prescription for albuterol for home and instructed on use with AeroChamber.  She is also given Tessalon Perles as needed for daytime cough and a small supply of Promethazine DM for nighttime cough.  Patient is counseled on not driving while taking this medication as it can cause drowsiness.  Reviewed with patient the importance of having a friend or other family member  prescription at pharmacy as the patient is to self isolate at home.    Nasal saline rinse  Over-the-counter Flonase nasal spray per 's instructions  Mucinex as needed    May use Tylenol or ibuprofen over-the-counter as needed for pain or fever not to exceed recommended daily dose.    Increase fluids, rest.  Patient may use salt water gargles, ice pops, cool fluids.      Testing performed for COVID-19.  Patient/guardian is given printed /MyChart instructions regarding self-isolation.  Work/school note is provided with specific return to work/school protocols.  Reviewed with patient/guardian that if they do test positive they will be contacted by their local health department regarding return to work/school protocols.  Results will also be released to patient/guardian in MyChart or called to the patient/guardian directly.  Encouraged mask use, frequent handwashing, wiping down hard surfaces, etc.    Upon entering exam room I ensured patient was wearing a mask.  This provider wore appropriate PPE throughout entire visit.  Patient wore mask entire visit except for a brief period while examining oropharynx.    Differential diagnosis, natural history, supportive care, and indications for  immediate follow-up discussed.     Red flag warning symptoms and strict ER/follow-up precautions given.  The patient demonstrated a good understanding and agreed with the treatment plan.  Please note that this note was created using voice recognition speech to text software. Every effort has been made to correct obvious errors.  However, I expect there are errors of grammar and possibly context that were not discovered prior to finalizing the note  JAILENE Alvarez PA-C

## 2021-01-01 LAB
COVID ORDER STATUS COVID19: NORMAL
SARS-COV-2 RNA RESP QL NAA+PROBE: DETECTED
SPECIMEN SOURCE: ABNORMAL

## 2021-06-30 ENCOUNTER — TELEPHONE (OUTPATIENT)
Dept: SCHEDULING | Facility: IMAGING CENTER | Age: 46
End: 2021-06-30

## 2021-07-01 ENCOUNTER — TELEPHONE (OUTPATIENT)
Dept: MEDICAL GROUP | Age: 46
End: 2021-07-01

## 2021-07-01 NOTE — TELEPHONE ENCOUNTER
1. Caller Name: Shawna Walker                          Call Back Number: [phone] (home) [phone] (work)        How would the patient prefer to be contacted with a response: Phone call do NOT leave a detailed message    left message to complete NP pvp. 1st attempt

## 2021-07-02 ENCOUNTER — OFFICE VISIT (OUTPATIENT)
Dept: MEDICAL GROUP | Age: 46
End: 2021-07-02

## 2021-07-02 ENCOUNTER — HOSPITAL ENCOUNTER (OUTPATIENT)
Dept: LAB | Facility: MEDICAL CENTER | Age: 46
End: 2021-07-02
Attending: PHYSICIAN ASSISTANT

## 2021-07-02 VITALS
WEIGHT: 210 LBS | DIASTOLIC BLOOD PRESSURE: 62 MMHG | SYSTOLIC BLOOD PRESSURE: 102 MMHG | BODY MASS INDEX: 33.75 KG/M2 | OXYGEN SATURATION: 95 % | HEART RATE: 71 BPM | TEMPERATURE: 98.1 F | HEIGHT: 66 IN

## 2021-07-02 DIAGNOSIS — Z00.00 BLOOD TESTS FOR ROUTINE GENERAL PHYSICAL EXAMINATION: ICD-10-CM

## 2021-07-02 DIAGNOSIS — Z12.31 ENCOUNTER FOR SCREENING MAMMOGRAM FOR BREAST CANCER: ICD-10-CM

## 2021-07-02 DIAGNOSIS — Z76.89 ESTABLISHING CARE WITH NEW DOCTOR, ENCOUNTER FOR: ICD-10-CM

## 2021-07-02 DIAGNOSIS — Z86.59 HISTORY OF ANXIETY: ICD-10-CM

## 2021-07-02 PROBLEM — F10.921 ACUTE ALCOHOLIC INTOXICATION WITH DELIRIUM (HCC): Status: RESOLVED | Noted: 2018-09-11 | Resolved: 2021-07-02

## 2021-07-02 PROBLEM — R09.02 HYPOXEMIA: Status: RESOLVED | Noted: 2018-09-11 | Resolved: 2021-07-02

## 2021-07-02 PROBLEM — T50.901A OVERDOSE: Status: RESOLVED | Noted: 2018-09-11 | Resolved: 2021-07-02

## 2021-07-02 LAB
ALBUMIN SERPL BCP-MCNC: 4.2 G/DL (ref 3.2–4.9)
ALBUMIN/GLOB SERPL: 1.4 G/DL
ALP SERPL-CCNC: 71 U/L (ref 30–99)
ALT SERPL-CCNC: 20 U/L (ref 2–50)
ANION GAP SERPL CALC-SCNC: 10 MMOL/L (ref 7–16)
AST SERPL-CCNC: 22 U/L (ref 12–45)
BASOPHILS # BLD AUTO: 0 % (ref 0–1.8)
BASOPHILS # BLD: 0 K/UL (ref 0–0.12)
BILIRUB SERPL-MCNC: 0.4 MG/DL (ref 0.1–1.5)
BUN SERPL-MCNC: 17 MG/DL (ref 8–22)
CALCIUM SERPL-MCNC: 9.6 MG/DL (ref 8.5–10.5)
CHLORIDE SERPL-SCNC: 106 MMOL/L (ref 96–112)
CHOLEST SERPL-MCNC: 153 MG/DL (ref 100–199)
CO2 SERPL-SCNC: 24 MMOL/L (ref 20–33)
CREAT SERPL-MCNC: 0.69 MG/DL (ref 0.5–1.4)
EOSINOPHIL # BLD AUTO: 0.11 K/UL (ref 0–0.51)
EOSINOPHIL NFR BLD: 1.7 % (ref 0–6.9)
ERYTHROCYTE [DISTWIDTH] IN BLOOD BY AUTOMATED COUNT: 42.1 FL (ref 35.9–50)
FASTING STATUS PATIENT QL REPORTED: NORMAL
GLOBULIN SER CALC-MCNC: 3.1 G/DL (ref 1.9–3.5)
GLUCOSE SERPL-MCNC: 108 MG/DL (ref 65–99)
HCT VFR BLD AUTO: 45.3 % (ref 37–47)
HDLC SERPL-MCNC: 40 MG/DL
HGB BLD-MCNC: 15 G/DL (ref 12–16)
LDLC SERPL CALC-MCNC: 95 MG/DL
LYMPHOCYTES # BLD AUTO: 2.1 K/UL (ref 1–4.8)
LYMPHOCYTES NFR BLD: 31.3 % (ref 22–41)
MANUAL DIFF BLD: NORMAL
MCH RBC QN AUTO: 31.5 PG (ref 27–33)
MCHC RBC AUTO-ENTMCNC: 33.1 G/DL (ref 33.6–35)
MCV RBC AUTO: 95.2 FL (ref 81.4–97.8)
MONOCYTES # BLD AUTO: 0.29 K/UL (ref 0–0.85)
MONOCYTES NFR BLD AUTO: 4.4 % (ref 0–13.4)
MORPHOLOGY BLD-IMP: NORMAL
MYELOCYTES NFR BLD MANUAL: 0.9 %
NEUTROPHILS # BLD AUTO: 4.13 K/UL (ref 2–7.15)
NEUTROPHILS NFR BLD: 61.7 % (ref 44–72)
NRBC # BLD AUTO: 0 K/UL
NRBC BLD-RTO: 0 /100 WBC
PLATELET # BLD AUTO: 279 K/UL (ref 164–446)
PLATELET BLD QL SMEAR: NORMAL
PMV BLD AUTO: 9.7 FL (ref 9–12.9)
POTASSIUM SERPL-SCNC: 4.7 MMOL/L (ref 3.6–5.5)
PROT SERPL-MCNC: 7.3 G/DL (ref 6–8.2)
RBC # BLD AUTO: 4.76 M/UL (ref 4.2–5.4)
SODIUM SERPL-SCNC: 140 MMOL/L (ref 135–145)
TRIGL SERPL-MCNC: 90 MG/DL (ref 0–149)
TSH SERPL DL<=0.005 MIU/L-ACNC: 1.93 UIU/ML (ref 0.38–5.33)
WBC # BLD AUTO: 6.7 K/UL (ref 4.8–10.8)

## 2021-07-02 PROCEDURE — 80053 COMPREHEN METABOLIC PANEL: CPT

## 2021-07-02 PROCEDURE — 85027 COMPLETE CBC AUTOMATED: CPT

## 2021-07-02 PROCEDURE — 80061 LIPID PANEL: CPT

## 2021-07-02 PROCEDURE — 84443 ASSAY THYROID STIM HORMONE: CPT

## 2021-07-02 PROCEDURE — 85007 BL SMEAR W/DIFF WBC COUNT: CPT

## 2021-07-02 PROCEDURE — 99213 OFFICE O/P EST LOW 20 MIN: CPT | Performed by: PHYSICIAN ASSISTANT

## 2021-07-02 PROCEDURE — 36415 COLL VENOUS BLD VENIPUNCTURE: CPT

## 2021-07-02 SDOH — ECONOMIC STABILITY: FOOD INSECURITY: WITHIN THE PAST 12 MONTHS, YOU WORRIED THAT YOUR FOOD WOULD RUN OUT BEFORE YOU GOT MONEY TO BUY MORE.: NEVER TRUE

## 2021-07-02 SDOH — ECONOMIC STABILITY: INCOME INSECURITY: HOW HARD IS IT FOR YOU TO PAY FOR THE VERY BASICS LIKE FOOD, HOUSING, MEDICAL CARE, AND HEATING?: NOT VERY HARD

## 2021-07-02 SDOH — ECONOMIC STABILITY: FOOD INSECURITY: WITHIN THE PAST 12 MONTHS, THE FOOD YOU BOUGHT JUST DIDN'T LAST AND YOU DIDN'T HAVE MONEY TO GET MORE.: NEVER TRUE

## 2021-07-02 SDOH — HEALTH STABILITY: PHYSICAL HEALTH: ON AVERAGE, HOW MANY DAYS PER WEEK DO YOU ENGAGE IN MODERATE TO STRENUOUS EXERCISE (LIKE A BRISK WALK)?: 0 DAYS

## 2021-07-02 SDOH — HEALTH STABILITY: MENTAL HEALTH
STRESS IS WHEN SOMEONE FEELS TENSE, NERVOUS, ANXIOUS, OR CAN'T SLEEP AT NIGHT BECAUSE THEIR MIND IS TROUBLED. HOW STRESSED ARE YOU?: TO SOME EXTENT

## 2021-07-02 SDOH — ECONOMIC STABILITY: HOUSING INSECURITY
IN THE LAST 12 MONTHS, WAS THERE A TIME WHEN YOU DID NOT HAVE A STEADY PLACE TO SLEEP OR SLEPT IN A SHELTER (INCLUDING NOW)?: NO

## 2021-07-02 SDOH — ECONOMIC STABILITY: TRANSPORTATION INSECURITY
IN THE PAST 12 MONTHS, HAS LACK OF TRANSPORTATION KEPT YOU FROM MEETINGS, WORK, OR FROM GETTING THINGS NEEDED FOR DAILY LIVING?: NO

## 2021-07-02 SDOH — HEALTH STABILITY: PHYSICAL HEALTH: ON AVERAGE, HOW MANY MINUTES DO YOU ENGAGE IN EXERCISE AT THIS LEVEL?: 0 MIN

## 2021-07-02 SDOH — ECONOMIC STABILITY: TRANSPORTATION INSECURITY
IN THE PAST 12 MONTHS, HAS LACK OF RELIABLE TRANSPORTATION KEPT YOU FROM MEDICAL APPOINTMENTS, MEETINGS, WORK OR FROM GETTING THINGS NEEDED FOR DAILY LIVING?: NO

## 2021-07-02 SDOH — ECONOMIC STABILITY: HOUSING INSECURITY: IN THE LAST 12 MONTHS, HOW MANY PLACES HAVE YOU LIVED?: 1

## 2021-07-02 SDOH — ECONOMIC STABILITY: INCOME INSECURITY: IN THE LAST 12 MONTHS, WAS THERE A TIME WHEN YOU WERE NOT ABLE TO PAY THE MORTGAGE OR RENT ON TIME?: NO

## 2021-07-02 SDOH — ECONOMIC STABILITY: TRANSPORTATION INSECURITY
IN THE PAST 12 MONTHS, HAS THE LACK OF TRANSPORTATION KEPT YOU FROM MEDICAL APPOINTMENTS OR FROM GETTING MEDICATIONS?: NO

## 2021-07-02 ASSESSMENT — PATIENT HEALTH QUESTIONNAIRE - PHQ9
CLINICAL INTERPRETATION OF PHQ2 SCORE: 2
5. POOR APPETITE OR OVEREATING: 2 - MORE THAN HALF THE DAYS
SUM OF ALL RESPONSES TO PHQ QUESTIONS 1-9: 7

## 2021-07-02 ASSESSMENT — SOCIAL DETERMINANTS OF HEALTH (SDOH)
HOW MANY DRINKS CONTAINING ALCOHOL DO YOU HAVE ON A TYPICAL DAY WHEN YOU ARE DRINKING: 1 OR 2
HOW OFTEN DO YOU GET TOGETHER WITH FRIENDS OR RELATIVES?: ONCE A WEEK
WITHIN THE PAST 12 MONTHS, YOU WORRIED THAT YOUR FOOD WOULD RUN OUT BEFORE YOU GOT THE MONEY TO BUY MORE: NEVER TRUE
IN A TYPICAL WEEK, HOW MANY TIMES DO YOU TALK ON THE PHONE WITH FAMILY, FRIENDS, OR NEIGHBORS?: MORE THAN THREE TIMES A WEEK
DO YOU BELONG TO ANY CLUBS OR ORGANIZATIONS SUCH AS CHURCH GROUPS UNIONS, FRATERNAL OR ATHLETIC GROUPS, OR SCHOOL GROUPS?: NO
HOW OFTEN DO YOU HAVE SIX OR MORE DRINKS ON ONE OCCASION: NEVER
HOW HARD IS IT FOR YOU TO PAY FOR THE VERY BASICS LIKE FOOD, HOUSING, MEDICAL CARE, AND HEATING?: NOT VERY HARD
HOW OFTEN DO YOU ATTEND CHURCH OR RELIGIOUS SERVICES?: 1 TO 4 TIMES PER YEAR
HOW OFTEN DO YOU HAVE A DRINK CONTAINING ALCOHOL: 4 OR MORE TIMES A WEEK
HOW OFTEN DO YOU GET TOGETHER WITH FRIENDS OR RELATIVES?: ONCE A WEEK
DO YOU BELONG TO ANY CLUBS OR ORGANIZATIONS SUCH AS CHURCH GROUPS UNIONS, FRATERNAL OR ATHLETIC GROUPS, OR SCHOOL GROUPS?: NO
HOW OFTEN DO YOU ATTENT MEETINGS OF THE CLUB OR ORGANIZATION YOU BELONG TO?: NEVER
HOW OFTEN DO YOU ATTENT MEETINGS OF THE CLUB OR ORGANIZATION YOU BELONG TO?: NEVER
IN A TYPICAL WEEK, HOW MANY TIMES DO YOU TALK ON THE PHONE WITH FAMILY, FRIENDS, OR NEIGHBORS?: MORE THAN THREE TIMES A WEEK
HOW OFTEN DO YOU ATTEND CHURCH OR RELIGIOUS SERVICES?: 1 TO 4 TIMES PER YEAR

## 2021-07-02 ASSESSMENT — LIFESTYLE VARIABLES
HOW OFTEN DO YOU HAVE SIX OR MORE DRINKS ON ONE OCCASION: NEVER
HOW MANY STANDARD DRINKS CONTAINING ALCOHOL DO YOU HAVE ON A TYPICAL DAY: 1 OR 2
HOW OFTEN DO YOU HAVE A DRINK CONTAINING ALCOHOL: 4 OR MORE TIMES A WEEK

## 2021-07-02 NOTE — PROGRESS NOTES
"cc: Establish care    Subjective:     HPI    Shawna Walker is a 45 y.o. female presenting to establish care.  It has been about 5 years since she has been seen by primary care provider.  She works as  for [employer].  She does not have a gynecologist, but has history of total hysterectomy.  She is overdue for mammogram.     She recently started working, and a new job she walks 5 to 7 miles a day.  She has lost about 15 pounds.  She does not have a specific exercise routine.  States she has room for improvement in her diet.  She would like fasting labs completed.    She does have a history of depression anxiety, was previously on Prozac, but did not like the way she felt on these.  She feels that she is doing well without medications.  Declines counseling at this time.        Review of systems:  See above.       Current Outpatient Medications:   •  Multiple Vitamins-Minerals (WOMENS DAILY FORMULA PO), Take 1 Tab by mouth every morning., Disp: , Rfl:     Allergies, past medical history, past surgical history, family history, social history reviewed and updated    Objective:     Vitals: /62 (BP Location: Left arm, Patient Position: Sitting, BP Cuff Size: Adult)   Pulse 71   Temp 36.7 °C (98.1 °F) (Temporal)   Ht 1.676 m (5' 6\")   Wt 95.3 kg (210 lb)   LMP 07/31/2016   SpO2 95%   BMI 33.89 kg/m²   General: Alert, pleasant, NAD  HEENT: Normocephalic. Neck supple.  No thyromegaly or masses palpated. No cervical or supraclavicular lymphadenopathy. No carotid bruits   Heart: Regular rate and rhythm.  S1 and S2 normal.  No murmurs appreciated.  Respiratory: Normal respiratory effort.  Clear to auscultation bilaterally.  Skin: Warm, dry, no rashes.  Extremities: No leg edema.  Radial pulses 2+ symmetric  Psych:  Affect/mood is normal, judgement is good, memory is intact, grooming is appropriate.    Assessment/Plan:     Shawna was seen today for [facility].    Diagnoses and all orders for this " visit:    BMI 33.0-33.9,adult  -She has successfully lost 15 pounds.  Advised to increase physical activity as tolerated and reviewed diet control.  Will check below labs.  Further treatment if needed pending results  -     Comp Metabolic Panel; Future  -     Lipid Profile; Future  -     TSH WITH REFLEX TO FT4; Future        - Patient identified as having weight management issue.  Appropriate          orders and counseling given    History of anxiety  -Doing well without medications.  Discussed if any point she would like a referral to counseling or discuss medication management she can always follow-up.  She is agreeable.    Encounter for screening mammogram for breast cancer  -     MA-SCREENING MAMMO BILAT W/TOMOSYNTHESIS W/CAD; Future    Blood tests for routine general physical examination  -     CBC WITH DIFFERENTIAL; Future  -     Comp Metabolic Panel; Future  -     Lipid Profile; Future  -     TSH WITH REFLEX TO FT4; Future    Establishing care with new doctor, encounter for  We will request records and review when received              Return in about 6 weeks (around 8/13/2021) for Annual PX, Lab Review.

## 2021-07-28 ENCOUNTER — APPOINTMENT (OUTPATIENT)
Dept: RADIOLOGY | Facility: MEDICAL CENTER | Age: 46
End: 2021-07-28
Attending: PHYSICIAN ASSISTANT
Payer: COMMERCIAL

## 2021-07-30 ENCOUNTER — APPOINTMENT (OUTPATIENT)
Dept: MEDICAL GROUP | Age: 46
End: 2021-07-30

## 2022-08-12 ENCOUNTER — OFFICE VISIT (OUTPATIENT)
Dept: URGENT CARE | Facility: CLINIC | Age: 47
End: 2022-08-12

## 2022-08-12 VITALS
OXYGEN SATURATION: 95 % | DIASTOLIC BLOOD PRESSURE: 70 MMHG | TEMPERATURE: 98 F | WEIGHT: 210 LBS | SYSTOLIC BLOOD PRESSURE: 110 MMHG | RESPIRATION RATE: 16 BRPM | HEIGHT: 67 IN | BODY MASS INDEX: 32.96 KG/M2 | HEART RATE: 110 BPM

## 2022-08-12 DIAGNOSIS — U07.1 COVID-19: ICD-10-CM

## 2022-08-12 PROCEDURE — 99213 OFFICE O/P EST LOW 20 MIN: CPT | Performed by: PHYSICIAN ASSISTANT

## 2022-08-12 RX ORDER — DEXTROMETHORPHAN HYDROBROMIDE AND PROMETHAZINE HYDROCHLORIDE 15; 6.25 MG/5ML; MG/5ML
5 SYRUP ORAL EVERY 6 HOURS PRN
Qty: 118 ML | Refills: 0 | Status: SHIPPED | OUTPATIENT
Start: 2022-08-12 | End: 2022-08-19

## 2022-08-12 ASSESSMENT — ENCOUNTER SYMPTOMS
NAUSEA: 0
CHILLS: 1
EYE PAIN: 0
MYALGIAS: 1
DIARRHEA: 0
FEVER: 1
CONSTIPATION: 0
SPUTUM PRODUCTION: 1
SORE THROAT: 1
SHORTNESS OF BREATH: 0
HEADACHES: 1
COUGH: 1
VOMITING: 0
ABDOMINAL PAIN: 0

## 2022-08-12 ASSESSMENT — FIBROSIS 4 INDEX: FIB4 SCORE: 0.81

## 2022-08-12 NOTE — PROGRESS NOTES
"Subjective:   Shawna Walker is a 46 y.o. female who presents for Cough (X 3 days, pain with coughing, middle back pain, sore throat feels raw. Tested positive for covid two days ago.)      46 years old female notes today is 3-4 days of URI symptoms with covid positive exposure and positive home test 2 days ago. She notes severe sore throat but tolerating liquids.  She also notes difficulty sleeping secondary to her symptoms.  She notes significant productive cough, headache, myalgias.  She has been using apap and advil with minimal improvement. Notes previous vaccination with J&J, and previous infection.      Review of Systems   Constitutional:  Positive for chills, fever and malaise/fatigue.   HENT:  Positive for congestion and sore throat. Negative for ear pain.    Eyes:  Negative for pain.   Respiratory:  Positive for cough and sputum production. Negative for shortness of breath.    Cardiovascular:  Negative for chest pain.   Gastrointestinal:  Negative for abdominal pain, constipation, diarrhea, nausea and vomiting.   Genitourinary:  Negative for dysuria.   Musculoskeletal:  Positive for myalgias.   Skin:  Negative for rash.   Neurological:  Positive for headaches.     Medications, Allergies, and current problem list reviewed today in Epic.     Objective:     /70   Pulse (!) 110   Temp 36.7 °C (98 °F) (Temporal)   Resp 16   Ht 1.689 m (5' 6.5\")   Wt 95.3 kg (210 lb)   SpO2 95%     Physical Exam  Vitals reviewed.   Constitutional:       Appearance: Normal appearance.   HENT:      Head: Normocephalic and atraumatic.      Right Ear: Tympanic membrane, ear canal and external ear normal.      Left Ear: Tympanic membrane, ear canal and external ear normal.      Nose: Rhinorrhea present.      Mouth/Throat:      Mouth: Mucous membranes are moist.      Pharynx: Oropharynx is clear. No oropharyngeal exudate or posterior oropharyngeal erythema.   Eyes:      Conjunctiva/sclera: Conjunctivae normal.      Pupils: " Pupils are equal, round, and reactive to light.   Cardiovascular:      Rate and Rhythm: Normal rate and regular rhythm.   Pulmonary:      Effort: Pulmonary effort is normal.      Breath sounds: Normal breath sounds.   Musculoskeletal:      Cervical back: Normal range of motion.      Right lower leg: No edema.      Left lower leg: No edema.   Lymphadenopathy:      Cervical: No cervical adenopathy.   Skin:     General: Skin is warm and dry.      Capillary Refill: Capillary refill takes less than 2 seconds.   Neurological:      Mental Status: She is alert and oriented to person, place, and time.       Assessment/Plan:     Diagnosis and associated orders:     1. COVID-19  Nirmatrelvir & Ritonavir 20 x 150 MG & 10 x 100MG Tablet Therapy Pack    promethazine-dextromethorphan (PROMETHAZINE-DM) 6.25-15 MG/5ML syrup         Comments/MDM:     Discussed supportive care with the addition of prescription promethazine with dextromethorphan cough syrup.  I cautioned them to not take other medicine which contains similar ingredients as well as the high potential for sedation with this medication.  I have reviewed the patient's chart and discussed current prescription and over-the-counter medication usage.  To the best my knowledge there is no evidence of decreased kidney function nor any medication usage which would adversely interact with Paxlovid antiviral therapy.  I discussed the risk versus benefits with the patient and they have agreed to trial this medication with full knowledge of its emergency use authorization.  Patient COVID-positive she has some natural and acquired immunity and no significant comorbidities that would lead me to believe she will suffer from serious sequela.  She is currently hemodynamically stable without hypoxia or evidence of endorgan injury.  I think she is a good candidate for antiviral therapy and outpatient monitoring.  We discussed CDC guidelines, isolation, return precautions etc.          Differential diagnosis, natural history, supportive care, and indications for immediate follow-up discussed.    Advised the patient to follow-up with the primary care physician for recheck, reevaluation, and consideration of further management.    Please note that this dictation was created using voice recognition software. I have made a reasonable attempt to correct obvious errors, but I expect that there are errors of grammar and possibly content that I did not discover before finalizing the note.    This note was electronically signed by Tha Davison PA-C

## 2022-11-19 ENCOUNTER — HOSPITAL ENCOUNTER (EMERGENCY)
Facility: MEDICAL CENTER | Age: 47
End: 2022-11-19
Attending: EMERGENCY MEDICINE

## 2022-11-19 VITALS
DIASTOLIC BLOOD PRESSURE: 93 MMHG | HEIGHT: 66 IN | WEIGHT: 204.81 LBS | BODY MASS INDEX: 32.92 KG/M2 | HEART RATE: 97 BPM | TEMPERATURE: 97.6 F | SYSTOLIC BLOOD PRESSURE: 136 MMHG | OXYGEN SATURATION: 94 % | RESPIRATION RATE: 18 BRPM

## 2022-11-19 DIAGNOSIS — Z71.89 ADVICE GIVEN ABOUT 2019 NOVEL CORONAVIRUS INFECTION: ICD-10-CM

## 2022-11-19 DIAGNOSIS — J06.9 VIRAL URI WITH COUGH: ICD-10-CM

## 2022-11-19 LAB
EKG IMPRESSION: NORMAL
FLUAV RNA SPEC QL NAA+PROBE: POSITIVE
FLUBV RNA SPEC QL NAA+PROBE: NEGATIVE
RSV RNA SPEC QL NAA+PROBE: NEGATIVE
SARS-COV-2 RNA RESP QL NAA+PROBE: NOTDETECTED
SPECIMEN SOURCE: ABNORMAL

## 2022-11-19 PROCEDURE — C9803 HOPD COVID-19 SPEC COLLECT: HCPCS | Performed by: EMERGENCY MEDICINE

## 2022-11-19 PROCEDURE — 0241U HCHG SARS-COV-2 COVID-19 NFCT DS RESP RNA 4 TRGT MIC: CPT

## 2022-11-19 PROCEDURE — 93005 ELECTROCARDIOGRAM TRACING: CPT | Performed by: EMERGENCY MEDICINE

## 2022-11-19 PROCEDURE — 99284 EMERGENCY DEPT VISIT MOD MDM: CPT

## 2022-11-19 PROCEDURE — 93005 ELECTROCARDIOGRAM TRACING: CPT

## 2022-11-19 RX ORDER — LOPERAMIDE HYDROCHLORIDE 2 MG/1
12 TABLET ORAL EVERY MORNING
COMMUNITY

## 2022-11-19 RX ORDER — ACETAMINOPHEN 500 MG
500-1000 TABLET ORAL EVERY 6 HOURS PRN
Status: SHIPPED | COMMUNITY
End: 2023-03-06

## 2022-11-19 ASSESSMENT — FIBROSIS 4 INDEX: FIB4 SCORE: 0.83

## 2022-11-19 NOTE — ED TRIAGE NOTES
Pt states that on Wed she started having sudden onset of chest congestion and body aches. Pt also has intermittent sore throat. Pt has had COVID before and states this feels the same. Pt did not test self for COVID. Denies fever

## 2022-11-19 NOTE — ED PROVIDER NOTES
ED Provider Note      Means of Arrival: Private Vehicle  History obtained from: Patient    CHIEF COMPLAINT  Chief Complaint   Patient presents with    Congestion    Sore Throat    Body Aches       HPI  Shawna Walker is a 47 y.o. female who presents with body aches, cough, occasional sputum production, sore throat, fevers.  This onset 2-1/2 days ago.  She reports it feels very similar to when she previously had COVID over the summer.  She states that she received Paxlovid which was helpful for her and she is interested in getting Paxlovid again if this ends up being COVID-19.  She denies taking any medications.  She denies any vomiting, shortness of breath, abdominal pain.    REVIEW OF SYSTEMS  CONSTITUTIONAL: See HPI  CARDIOVASCULAR:  No chest discomfort.  RESPIRATORY:  No pleuritic chest pain.  GASTROINTESTINAL:  No abdominal pain.  GENITOURINARY:   No dysuria.  See HPI for further details.   All other systems are negative.     PAST MEDICAL HISTORY  Past Medical History:   Diagnosis Date    Acute alcoholic intoxication with delirium (HCC) 9/11/2018    Overdose 9/11/2018    PCOS (polycystic ovarian syndrome)     Psychiatric disorder     anxiety, depression       FAMILY HISTORY  Family History   Problem Relation Age of Onset    Diabetes Father 59    Heart Disease Father     Hypertension Father     Hyperlipidemia Father     Stroke Maternal Grandmother     Cancer Maternal Grandfather         ? type cancer    Heart Disease Paternal Grandfather 63    No Known Problems Daughter     No Known Problems Daughter     No Known Problems Son        SOCIAL HISTORY   reports that she has never smoked. She has never used smokeless tobacco. She reports current alcohol use of about 8.4 oz per week. She reports that she does not use drugs.    SURGICAL HISTORY  Past Surgical History:   Procedure Laterality Date    HYSTERECTOMY ROBOTIC  8/15/2016    Procedure: HYSTERECTOMY ROBOTIC , BILATERAL SALPINGECTOMY;  Surgeon: Zuleika Vazquez,  "M.D.;  Location: SURGERY [facility];  Service:     CYSTOSCOPY  8/15/2016    Procedure: CYSTOSCOPY;  Surgeon: Zuleika Vazquez M.D.;  Location: SURGERY [facility];  Service:     TUBAL REANASTOMOSIS  2015    TUBAL LIGATION         CURRENT MEDICATIONS  Home Medications       Reviewed by Ketty Pollack (Pharmacy Tech) on 22 at 0938  Med List Status: Complete     Medication Last Dose Status   acetaminophen (TYLENOL) 500 MG Tab 2022 Active   loperamide (IMODIUM A-D) 2 MG tablet 2022 Active                    ALLERGIES  Allergies   Allergen Reactions    Sudafed Unspecified     \"reverse effect\" makes mucous thicker   RXN=15 years ago stopped taking       PHYSICAL EXAM  VITAL SIGNS: BP (!) 127/108   Pulse (!) 107   Temp 36.4 °C (97.6 °F) (Temporal)   Resp 14   Ht 1.676 m (5' 6\")   Wt 92.9 kg (204 lb 12.9 oz)   LMP 2016   SpO2 93%   BMI 33.06 kg/m²    Gen: Alert  HENT: ATNC  Eyes: Normal conjunctiva  Neck: trachea midline  Resp: no respiratory distress, clear to auscultation bilaterally  CV: No JVD, RRR, no murmurs, rubs, gallops.  No pedal edema.  No calf tenderness  Abd: non-distended, nontender  Ext: No deformities  Psych: normal mood  Neuro: speech fluent     LABS  Labs Reviewed   COV-2, FLU A/B, AND RSV BY PCR ([email or web address])        EKG  Results for orders placed or performed during the hospital encounter of 22   EKG   Result Value Ref Range    Report       [facility] Emergency Dept.    Test Date:  2022  Pt Name:    DALLAS ZHAO                 Department: [facility]  MRN:        [id number]                      Room:  Gender:     Female                       Technician: HILDA  :        1975                   Requested By:ER TRIAGE PROTOCOL  Order #:    [id number]                    Reading MD: Vijay Anderson    Measurements  Intervals                                Axis  Rate:       96                           P:          60  LA:         130      "                     QRS:        8  QRSD:       83                           T:          30  QT:         346  QTc:        438    Interpretive Statements  Sinus rhythm  Minimal ST depression, anterolateral leads  Compared to ECG 06/19/2019 09:57:40  ST (T wave) deviation now present  Left ventricular hypertrophy no longer present  Electronically Signed On 11- 9:37:37 PST by Vijay Anderson          COURSE & MEDICAL DECISION MAKING  Pertinent Labs & Imaging studies reviewed. (See chart for details)    Review of medical records: Patient was seen in urgent care 8/12/2022, prescribed Paxlovid for COVID-19.    Patient presents with symptoms of a viral respiratory infection.  No evidence for pneumonia, meningitis, intra-abdominal infection.  She is interested in COVID-19 testing, which will be performed.  If positive, she will still be within the window to initiate Paxlovid and would like to have it if possible.  Will perform the 2-4-hour test and pharmacy can follow-up on her test result.    The patient does have some slight ST changes on EKG compared to her prior, however her clinical presentation does not fit with ACS.  Low suspicion for pericarditis.    The patient was given return precautions, anticipatory guidance, and the opportunity to ask questions prior to discharge.     Appropriate PPE were worn at this encounter.     FINAL IMPRESSION  1. Viral URI with cough    2. Advice given about 2019 novel coronavirus infection         DISPOSITION:  Patient will be discharged home in stable condition.    FOLLOW UP:  [facility], Emergency Dept  [address]  [facility] [id number] [phone]    If symptoms worsen    Your regular doctor.  To establish a primary care provider within our system, please call [phone]    Schedule an appointment as soon as possible for a visit         This dictation was created using voice recognition software. The accuracy of the dictation is limited to the  abilities of the software. I expect there may be some errors of grammar and possibly content. The nursing notes were reviewed and certain aspects of this information were incorporated into this note.

## 2022-11-19 NOTE — DISCHARGE INSTRUCTIONS
You were seen in the emergency department for an illness. Your physical exam and medical tests show you likely have a viral infection. This should improve over time. Treatment for this is to address your symptoms. This includes aggressive oral fluids, increased rest, and appropriate nutrition. You may use acetaminophen and ibuprofen for the treatment of discomfort and/or fever. Be careful that many cold remedies contain acetaminophen and you should not take more than 3000mg of acetaminophen (Tylenol) a day. You may perform warm salt water gargles every 1-2 hours as needed for sore throat. Saline (salt water) nasal sprays can help with sinus congestion.    COVID-19 testing has been sent.  This will be available in the [employer] application.  If this is positive, our pharmacist should reach out to you and can call in a prescription for Paxlovid.    Please return to the ED if your symptoms get worse, you develop shortness of breath, chest pain, rash or dehydration.

## 2023-03-06 ENCOUNTER — OFFICE VISIT (OUTPATIENT)
Dept: MEDICAL GROUP | Facility: MEDICAL CENTER | Age: 48
End: 2023-03-06
Payer: COMMERCIAL

## 2023-03-06 VITALS
DIASTOLIC BLOOD PRESSURE: 62 MMHG | WEIGHT: 216.05 LBS | RESPIRATION RATE: 16 BRPM | BODY MASS INDEX: 34.72 KG/M2 | SYSTOLIC BLOOD PRESSURE: 122 MMHG | TEMPERATURE: 98.2 F | OXYGEN SATURATION: 96 % | HEIGHT: 66 IN | HEART RATE: 74 BPM

## 2023-03-06 DIAGNOSIS — Z11.51 ENCOUNTER FOR SCREENING FOR HUMAN PAPILLOMAVIRUS (HPV): ICD-10-CM

## 2023-03-06 DIAGNOSIS — Z12.31 ENCOUNTER FOR SCREENING MAMMOGRAM FOR BREAST CANCER: ICD-10-CM

## 2023-03-06 DIAGNOSIS — Z11.59 ENCOUNTER FOR HEPATITIS C SCREENING TEST FOR LOW RISK PATIENT: ICD-10-CM

## 2023-03-06 DIAGNOSIS — Z13.220 LIPID SCREENING: ICD-10-CM

## 2023-03-06 DIAGNOSIS — Z00.00 ANNUAL PHYSICAL EXAM: ICD-10-CM

## 2023-03-06 DIAGNOSIS — Z13.29 THYROID DISORDER SCREEN: ICD-10-CM

## 2023-03-06 DIAGNOSIS — K52.9 CHRONIC DIARRHEA: ICD-10-CM

## 2023-03-06 DIAGNOSIS — Z12.11 COLON CANCER SCREENING: ICD-10-CM

## 2023-03-06 DIAGNOSIS — Z83.3 FAMILY HISTORY OF DIABETES MELLITUS: ICD-10-CM

## 2023-03-06 PROCEDURE — 99386 PREV VISIT NEW AGE 40-64: CPT | Performed by: INTERNAL MEDICINE

## 2023-03-06 SDOH — ECONOMIC STABILITY: INCOME INSECURITY: IN THE LAST 12 MONTHS, WAS THERE A TIME WHEN YOU WERE NOT ABLE TO PAY THE MORTGAGE OR RENT ON TIME?: NO

## 2023-03-06 SDOH — ECONOMIC STABILITY: INCOME INSECURITY: HOW HARD IS IT FOR YOU TO PAY FOR THE VERY BASICS LIKE FOOD, HOUSING, MEDICAL CARE, AND HEATING?: NOT VERY HARD

## 2023-03-06 SDOH — HEALTH STABILITY: PHYSICAL HEALTH: ON AVERAGE, HOW MANY DAYS PER WEEK DO YOU ENGAGE IN MODERATE TO STRENUOUS EXERCISE (LIKE A BRISK WALK)?: 1 DAY

## 2023-03-06 SDOH — ECONOMIC STABILITY: HOUSING INSECURITY: IN THE LAST 12 MONTHS, HOW MANY PLACES HAVE YOU LIVED?: 1

## 2023-03-06 SDOH — ECONOMIC STABILITY: FOOD INSECURITY: WITHIN THE PAST 12 MONTHS, YOU WORRIED THAT YOUR FOOD WOULD RUN OUT BEFORE YOU GOT MONEY TO BUY MORE.: NEVER TRUE

## 2023-03-06 SDOH — ECONOMIC STABILITY: FOOD INSECURITY: WITHIN THE PAST 12 MONTHS, THE FOOD YOU BOUGHT JUST DIDN'T LAST AND YOU DIDN'T HAVE MONEY TO GET MORE.: NEVER TRUE

## 2023-03-06 SDOH — HEALTH STABILITY: PHYSICAL HEALTH: ON AVERAGE, HOW MANY MINUTES DO YOU ENGAGE IN EXERCISE AT THIS LEVEL?: 0 MIN

## 2023-03-06 SDOH — HEALTH STABILITY: MENTAL HEALTH
STRESS IS WHEN SOMEONE FEELS TENSE, NERVOUS, ANXIOUS, OR CAN'T SLEEP AT NIGHT BECAUSE THEIR MIND IS TROUBLED. HOW STRESSED ARE YOU?: NOT AT ALL

## 2023-03-06 ASSESSMENT — FIBROSIS 4 INDEX: FIB4 SCORE: 0.83

## 2023-03-06 ASSESSMENT — LIFESTYLE VARIABLES
HOW OFTEN DO YOU HAVE SIX OR MORE DRINKS ON ONE OCCASION: NEVER
HOW OFTEN DO YOU HAVE A DRINK CONTAINING ALCOHOL: 4 OR MORE TIMES A WEEK
SKIP TO QUESTIONS 9-10: 0
HOW MANY STANDARD DRINKS CONTAINING ALCOHOL DO YOU HAVE ON A TYPICAL DAY: 3 OR 4
AUDIT-C TOTAL SCORE: 5

## 2023-03-06 ASSESSMENT — SOCIAL DETERMINANTS OF HEALTH (SDOH)
HOW OFTEN DO YOU GET TOGETHER WITH FRIENDS OR RELATIVES?: ONCE A WEEK
DO YOU BELONG TO ANY CLUBS OR ORGANIZATIONS SUCH AS CHURCH GROUPS UNIONS, FRATERNAL OR ATHLETIC GROUPS, OR SCHOOL GROUPS?: NO
HOW OFTEN DO YOU ATTEND CHURCH OR RELIGIOUS SERVICES?: NEVER
IN A TYPICAL WEEK, HOW MANY TIMES DO YOU TALK ON THE PHONE WITH FAMILY, FRIENDS, OR NEIGHBORS?: MORE THAN THREE TIMES A WEEK
HOW OFTEN DO YOU ATTEND CHURCH OR RELIGIOUS SERVICES?: NEVER
HOW OFTEN DO YOU ATTENT MEETINGS OF THE CLUB OR ORGANIZATION YOU BELONG TO?: NEVER
HOW MANY DRINKS CONTAINING ALCOHOL DO YOU HAVE ON A TYPICAL DAY WHEN YOU ARE DRINKING: 3 OR 4
HOW OFTEN DO YOU HAVE SIX OR MORE DRINKS ON ONE OCCASION: NEVER
HOW OFTEN DO YOU ATTENT MEETINGS OF THE CLUB OR ORGANIZATION YOU BELONG TO?: NEVER
HOW HARD IS IT FOR YOU TO PAY FOR THE VERY BASICS LIKE FOOD, HOUSING, MEDICAL CARE, AND HEATING?: NOT VERY HARD
IN A TYPICAL WEEK, HOW MANY TIMES DO YOU TALK ON THE PHONE WITH FAMILY, FRIENDS, OR NEIGHBORS?: MORE THAN THREE TIMES A WEEK
WITHIN THE PAST 12 MONTHS, YOU WORRIED THAT YOUR FOOD WOULD RUN OUT BEFORE YOU GOT THE MONEY TO BUY MORE: NEVER TRUE
HOW OFTEN DO YOU GET TOGETHER WITH FRIENDS OR RELATIVES?: ONCE A WEEK
HOW OFTEN DO YOU HAVE A DRINK CONTAINING ALCOHOL: 4 OR MORE TIMES A WEEK
DO YOU BELONG TO ANY CLUBS OR ORGANIZATIONS SUCH AS CHURCH GROUPS UNIONS, FRATERNAL OR ATHLETIC GROUPS, OR SCHOOL GROUPS?: NO

## 2023-03-06 ASSESSMENT — PATIENT HEALTH QUESTIONNAIRE - PHQ9: CLINICAL INTERPRETATION OF PHQ2 SCORE: 0

## 2023-03-06 NOTE — ASSESSMENT & PLAN NOTE
This is a chronic problem, possible IBS.  Refer to GI for colonoscopy.  Patient is due for colorectal cancer screening.

## 2023-03-06 NOTE — PROGRESS NOTES
Subjective:     CC:   Chief Complaint   Patient presents with    [facility]     HPI:  Shawna Walker is a 47 y.o. female who presents for annual exam. She is feeling well and denies any complaints.    Ob-Gyn/ History:    Patient has GYN provider: no   /Para:  4/3   Last Pap Smear:  >5 years ago   Gyn Surgery: Hysterectomy  Current Contraceptive Method: none.  Not currently sexually active.  Post-menopausal bleeding: no  Urinary incontinence: no     Health Maintenance  Cholesterol Screenin2021   Diabetes Screening: ordered  Diet: Patient notes she can do better, she was starting a breathing more vegetables and fiber, smaller servings.  Exercise: Not exercise, however the job is physical, she walks 5+ miles a day.  Substance Abuse: no   Safe in relationship.  Seat belts, bike helmet, gun safety discussed.  Sun protection used.    Cancer screening  Colorectal Cancer Screening: Referred to gastroenterology  Lung Cancer Screening: not a smoker   Cervical Cancer Screening: Referred to OB/GYN  Breast Cancer Screening: Overdue, referred    Infectious disease screening/Immunizations  --STI Screening: Declines  --Practices safe sex.  --HIV Screening: Declines  --Hepatitis C Screening: Declines  --Immunizations:    Influenza: Declines   Tetanus: UTD    Shingles: n/a    COVID-19: x1    Hepatitis B: unknown status     She  has a past medical history of Acute alcoholic intoxication with delirium (HCC) (2018), Overdose (2018), PCOS (polycystic ovarian syndrome), and Psychiatric disorder.  She  has a past surgical history that includes tubal ligation; tubal reanastomosis (2015); hysterectomy robotic (08/15/2016); cystoscopy (08/15/2016); abdominal hysterectomy total; and tubal coagulation laparoscopic bilateral.    Family History   Problem Relation Age of Onset    Heart Disease Father     Diabetes Father     Hypertension Father     Hyperlipidemia Father     Cancer Paternal Aunt         Colon cancer,  "Dx in 60s    Stroke Maternal Grandmother     Cancer Maternal Grandfather         colon, late    Heart Disease Paternal Grandfather         MI in 40s, in 60s ans pased away    No Known Problems Daughter     No Known Problems Daughter     No Known Problems Son      Social History     Socioeconomic History    Marital status: Single     Spouse name: Not on file    Number of children: Not on file    Years of education: Not on file    Highest education level: GED or equivalent   Occupational History    Not on file   Tobacco Use    Smoking status: Never    Smokeless tobacco: Never   Vaping Use    Vaping Use: Never used   Substance and Sexual Activity    Alcohol use: Yes     Alcohol/week: 2.4 oz     Types: 4 Glasses of wine per week     Comment: \"a little\"    Drug use: No    Sexual activity: Not Currently   Other Topics Concern    Not on file   Social History Narrative    Not on file     Social Determinants of Health     Financial Resource Strain: Low Risk     Difficulty of Paying Living Expenses: Not very hard   Food Insecurity: No Food Insecurity    Worried About Running Out of Food in the Last Year: Never true    Ran Out of Food in the Last Year: Never true   Transportation Needs: No Transportation Needs    Lack of Transportation (Medical): No    Lack of Transportation (Non-Medical): No   Physical Activity: Inactive    Days of Exercise per Week: 1 day    Minutes of Exercise per Session: 0 min   Stress: No Stress Concern Present    Feeling of Stress : Not at all   Social Connections: Socially Isolated    Frequency of Communication with Friends and Family: More than three times a week    Frequency of Social Gatherings with Friends and Family: Once a week    Attends [employer] Services: Never    Active Member of Clubs or Organizations: No    Attends Club or Organization Meetings: Never    Marital Status:    Intimate Partner Violence: Not on file   Housing Stability: Low Risk     Unable to Pay for Housing in the Last " "Year: No    Number of Places Lived in the Last Year: 1    Unstable Housing in the Last Year: No     Patient Active Problem List    Diagnosis Date Noted    Chronic diarrhea 03/06/2023    Obesity (BMI 30-39.9) 08/15/2017    Allergy to pollen      Current Outpatient Medications   Medication Sig Dispense Refill    loperamide (IMODIUM A-D) 2 MG tablet Take 6 Tablets by mouth every morning.       No current facility-administered medications for this visit.     Allergies   Allergen Reactions    Sudafed Unspecified     \"reverse effect\" makes mucous thicker   RXN=15 years ago stopped taking     Review of Systems   Constitutional: Negative for fever, chills and malaise/fatigue.   HENT: Negative for congestion.    Eyes: Negative for pain.    Respiratory: Negative for cough and shortness of breath.  Cardiovascular: Negative for leg swelling.   Gastrointestinal: Negative for nausea, vomiting, abdominal pain, positive for diarrhea.  Genitourinary: Negative for dysuria and hematuria.   Skin: Negative for rash.   Neurological: Negative for dizziness, focal weakness and headaches.   Endo/Heme/Allergies: Does not bleed easily.   Psychiatric/Behavioral: Negative for depression.  The patient is not nervous/anxious.      Objective:     /62   Pulse 74   Temp 36.8 °C (98.2 °F)   Resp 16   Ht 1.676 m (5' 6\")   Wt 98 kg (216 lb 0.8 oz)   LMP 09/01/2015   SpO2 96%   BMI 34.87 kg/m²   Body mass index is 34.87 kg/m².  Wt Readings from Last 4 Encounters:   03/06/23 98 kg (216 lb 0.8 oz)   11/19/22 92.9 kg (204 lb 12.9 oz)   08/12/22 95.3 kg (210 lb)   07/02/21 95.3 kg (210 lb)     Physical Exam  Constitutional:       Appearance: Normal appearance. She is obese.   HENT:      Head: Normocephalic and atraumatic.      Nose: Nose normal. No congestion.      Mouth/Throat:      Mouth: Mucous membranes are moist.      Pharynx: Oropharynx is clear. No oropharyngeal exudate.   Eyes:      Conjunctiva/sclera: Conjunctivae normal. "   Cardiovascular:      Rate and Rhythm: Normal rate and regular rhythm.      Pulses: Normal pulses.      Heart sounds: Normal heart sounds. No murmur heard.  Pulmonary:      Effort: Pulmonary effort is normal. No respiratory distress.      Breath sounds: Normal breath sounds.   Abdominal:      General: Abdomen is flat. There is no distension.      Palpations: Abdomen is soft.      Tenderness: There is no guarding.   Musculoskeletal:         General: Normal range of motion.      Right lower leg: No edema.      Left lower leg: No edema.   Skin:     General: Skin is warm.   Neurological:      General: No focal deficit present.      Mental Status: She is alert and oriented to person, place, and time.      Motor: No weakness.      Gait: Gait normal.   Psychiatric:         Mood and Affect: Mood normal.     Assessment and Plan:     Problem List Items Addressed This Visit       Chronic diarrhea     This is a chronic problem, possible IBS.  Refer to GI for colonoscopy.  Patient is due for colorectal cancer screening.          Other Visit Diagnoses       Colon cancer screening        Relevant Orders    Referral to GI for Colonoscopy    Encounter for screening for human papillomavirus (HPV)        Relevant Orders    Referral to OB/Gyn    Encounter for screening mammogram for breast cancer        Relevant Orders    MA-SCREENING MAMMO BILAT W/TOMOSYNTHESIS W/CAD    Annual physical exam        Relevant Orders    Comp Metabolic Panel    CBC WITH DIFFERENTIAL    Lipid screening        Relevant Orders    Lipid Profile    Family history of diabetes mellitus        Relevant Orders    HEMOGLOBIN A1C    Thyroid disorder screen        Relevant Orders    TSH WITH REFLEX TO FT4    Encounter for hepatitis C screening test for low risk patient        Relevant Orders    HCV Scrn ( [id number] [employer])          HCM:  as above  Labs per orders  Immunizations per orders  Patient counseled about skin care, diet, supplements, prenatal vitamins,  safe sex and exercise.    Follow-up: Return in about 1 year (around 3/6/2024), or if symptoms worsen or fail to improve.    Please note that this dictation was created using voice recognition software. I have made every reasonable attempt to correct obvious errors, but I expect that there are errors of grammar and possibly content that I did not discover before finalizing the note.

## 2023-03-22 ENCOUNTER — TELEPHONE (OUTPATIENT)
Dept: MEDICAL GROUP | Facility: MEDICAL CENTER | Age: 48
End: 2023-03-22

## 2023-03-22 DIAGNOSIS — Z12.11 COLON CANCER SCREENING: ICD-10-CM

## 2023-03-22 NOTE — TELEPHONE ENCOUNTER
Pt contacted office requesting alternate referral to [facility] as [facility] does not take pt health insurance due to payment discrepancy in the past.

## 2023-03-27 ENCOUNTER — APPOINTMENT (OUTPATIENT)
Dept: RADIOLOGY | Facility: MEDICAL CENTER | Age: 48
End: 2023-03-27
Attending: INTERNAL MEDICINE
Payer: COMMERCIAL

## 2023-03-27 DIAGNOSIS — Z12.31 ENCOUNTER FOR SCREENING MAMMOGRAM FOR BREAST CANCER: ICD-10-CM

## 2023-03-27 PROCEDURE — 77067 SCR MAMMO BI INCL CAD: CPT

## 2023-05-26 ENCOUNTER — GYNECOLOGY VISIT (OUTPATIENT)
Dept: OBGYN | Facility: CLINIC | Age: 48
End: 2023-05-26
Payer: COMMERCIAL

## 2023-05-26 VITALS — DIASTOLIC BLOOD PRESSURE: 86 MMHG | WEIGHT: 216 LBS | BODY MASS INDEX: 34.86 KG/M2 | SYSTOLIC BLOOD PRESSURE: 136 MMHG

## 2023-05-26 DIAGNOSIS — Z01.419 ENCOUNTER FOR ANNUAL ROUTINE GYNECOLOGICAL EXAMINATION: ICD-10-CM

## 2023-05-26 PROCEDURE — 3079F DIAST BP 80-89 MM HG: CPT | Performed by: OBSTETRICS & GYNECOLOGY

## 2023-05-26 PROCEDURE — 99396 PREV VISIT EST AGE 40-64: CPT | Performed by: OBSTETRICS & GYNECOLOGY

## 2023-05-26 PROCEDURE — 3075F SYST BP GE 130 - 139MM HG: CPT | Performed by: OBSTETRICS & GYNECOLOGY

## 2023-05-26 ASSESSMENT — FIBROSIS 4 INDEX: FIB4 SCORE: 0.83

## 2023-05-26 NOTE — PROGRESS NOTES
Well woman visit     Shawna Walker is a 47 y.o.  who presents to [facility] care for her Annual Exam.      GYN History:    Menarche: 15  Had a hysterectomy secondary to PCOS- had irregular bleeding and extreme bloating and hormonal swings. Isnt sure if they took her ovaries. Had it done with [place].   Last pap: never had an abnormal pap before hyst in 2016.   Sexually active: no  History of STDs: no  Fam Hx of breast, ovarian, or colon cancer: paternal aunt with colon cancer     OB History:        Health Maintenance:  Last mammogram: UTD  Last colorectal screening: had one this week   Immunizations: Gardasil vaccination     Review of Systems:   ROS:  Constitutional: No fever, weight loss, weight gain, or fatigue  Skin/breast: No breast lump, nipple discharge, acne  Cardiovascular: No chest pain, leg swelling, palpitations  Respiratory: No shortness of breath, wheezing, or cough  Genitourinary: No pelvic pain, vaginal discharge, painful urination, abnormal periods, involuntary loss of urine, or vaginal bulge.  GI: No diarrhea, constipation, blood in stool, vomiting, abdominal pain  Endocrine: No hot flashes, abnormal thirst  Psychiatric: No depression, anxiety, or thoughts of self-harm  Neurologic: No headaches or seizures  Heme/lymph: No enlarged lymph nodes, denies bruising/bleeding that will not stop  Allergic: Denies allergies  MSK: Denies muscle weakness    All PMH, PSH, allergies, social history and FH reviewed and updated today:  Past Medical History:  Past Medical History:   Diagnosis Date    Acute alcoholic intoxication with delirium (HCC) 2018    Overdose 2018    PCOS (polycystic ovarian syndrome)     Psychiatric disorder     anxiety, depression       Past Surgical History:  Past Surgical History:   Procedure Laterality Date    HYSTERECTOMY ROBOTIC  08/15/2016    Procedure: HYSTERECTOMY ROBOTIC , BILATERAL SALPINGECTOMY;  Surgeon: Zuleika Vazquez M.D.;  Location: SURGERY [facility]  "[facility];  Service:     CYSTOSCOPY  08/15/2016    Procedure: CYSTOSCOPY;  Surgeon: Zuleika Vazquez M.D.;  Location: SURGERY [facility];  Service:     TUBAL REANASTOMOSIS  06/01/2015    ABDOMINAL HYSTERECTOMY TOTAL      TUBAL COAGULATION LAPAROSCOPIC BILATERAL      TUBAL LIGATION         Medications:   Current Outpatient Medications Ordered in Epic   Medication Sig Dispense Refill    loperamide (IMODIUM A-D) 2 MG tablet Take 6 Tablets by mouth every morning.       No current [facility]-ordered facility-administered medications on file.       Allergies: Sudafed    Social History:  Social History     Socioeconomic History    Marital status: Single    Highest education level: GED or equivalent   Tobacco Use    Smoking status: Never    Smokeless tobacco: Never   Vaping Use    Vaping Use: Never used   Substance and Sexual Activity    Alcohol use: Yes     Alcohol/week: 2.4 oz     Types: 4 Glasses of wine per week     Comment: \"a little\"    Drug use: No    Sexual activity: Not Currently     Partners: Male     Birth control/protection: Surgical     Social Determinants of Health     Financial Resource Strain: Low Risk  (3/6/2023)    Overall Financial Resource Strain (CARDIA)     Difficulty of Paying Living Expenses: Not very hard   Food Insecurity: No Food Insecurity (3/6/2023)    Hunger Vital Sign     Worried About Running Out of Food in the Last Year: Never true     Ran Out of Food in the Last Year: Never true   Transportation Needs: No Transportation Needs (3/6/2023)    PRAPARE - Transportation     Lack of Transportation (Medical): No     Lack of Transportation (Non-Medical): No   Physical Activity: Inactive (3/6/2023)    Exercise Vital Sign     Days of Exercise per Week: 1 day     Minutes of Exercise per Session: 0 min   Stress: No Stress Concern Present (3/6/2023)    [employer] - Occupational Stress Questionnaire     Feeling of Stress : Not at all   Social Connections: Socially Isolated " (3/6/2023)    Social Connection and Isolation Panel [NHANES]     Frequency of Communication with Friends and Family: More than three times a week     Frequency of Social Gatherings with Friends and Family: Once a week     Attends Bahai Services: Never     Active Member of Clubs or Organizations: No     Attends Club or Organization Meetings: Never     Marital Status:    Housing Stability: Low Risk  (3/6/2023)    Housing Stability Vital Sign     Unable to Pay for Housing in the Last Year: No     Number of Places Lived in the Last Year: 1     Unstable Housing in the Last Year: No       Family History:  Family History   Problem Relation Age of Onset    Heart Disease Father     Diabetes Father     Hypertension Father     Hyperlipidemia Father     Cancer Paternal Aunt         Colon cancer, Dx in 60s    Stroke Maternal Grandmother     Cancer Maternal Grandfather         colon, late    Heart Disease Paternal Grandfather         MI in 40s, in 60s ans pased away    No Known Problems Daughter     No Known Problems Daughter     No Known Problems Son            Objective:   Vitals:  /86   Wt 216 lb   Body mass index is 34.86 kg/m². (Goal BM I>18 <25)    Physical Exam:   Nursing note and vitals reviewed.  Physical Exam   Constitutional: She is oriented to person, place, and time and well-developed, well-nourished, and in no distress.   HENT:   Head: Normocephalic and atraumatic.   Right Ear: External ear normal.   Eyes: Pupils are equal, round, and reactive to light. Conjunctivae are normal.   Neck: No thyromegaly present.   Cardiovascular: Intact distal pulses.   Pulmonary/Chest: Effort normal and breath sounds normal.   Abdominal: Soft. Bowel sounds are normal.   Musculoskeletal:         General: Normal range of motion.      Cervical back: Normal range of motion and neck supple.   Neurological: She is alert and oriented to person, place, and time. Gait normal.   Skin: Skin is warm and dry.   Psychiatric:  Mood, memory, affect and judgment normal.   Genitourinary:  Normal appearing external female genitalia without any rashes, lesions, labial fusion or tenderness.  Vagina is pink moist and well rugated. Physiologic discharge present within vaginal vault. No adnexal masses or tenderness.   Breast: No masses, skin dimpling, or lymphadenopathy noted bilaterally.  No nipple discharge.  Nipples everted.      Assessment/Plan:     1. Encounter for annual routine gynecological examination            Shawna Walker is a 47 y.o.  female who presents for her annual gynecologic exam.   # Preventative health care:   --Breast self awareness discussed. Mammogram UTD(annually starting at age 40).  --Cervical cancer screening. Pap smears not indicated as she had a hyst in 2016 and she had no abnormal paps leading up to that.   --Smoking - not a smoker.   --Colorectal screening UTD.   --Immunizations are up to date.  --Diet, exercise, vitamin supplementation, and hydration discussed.  # Contraception: n/a  # STD screening: declined  # Follow up annually or biannually.